# Patient Record
Sex: FEMALE | Race: WHITE | NOT HISPANIC OR LATINO | Employment: PART TIME | URBAN - METROPOLITAN AREA
[De-identification: names, ages, dates, MRNs, and addresses within clinical notes are randomized per-mention and may not be internally consistent; named-entity substitution may affect disease eponyms.]

---

## 2021-04-08 DIAGNOSIS — Z23 ENCOUNTER FOR IMMUNIZATION: ICD-10-CM

## 2022-05-09 ENCOUNTER — OFFICE VISIT (OUTPATIENT)
Dept: PODIATRY | Facility: CLINIC | Age: 67
End: 2022-05-09
Payer: COMMERCIAL

## 2022-05-09 VITALS — HEIGHT: 65 IN | WEIGHT: 162 LBS | RESPIRATION RATE: 16 BRPM | BODY MASS INDEX: 26.99 KG/M2

## 2022-05-09 DIAGNOSIS — M20.12 VALGUS DEFORMITY OF BOTH GREAT TOES: ICD-10-CM

## 2022-05-09 DIAGNOSIS — M21.42 ACQUIRED FLAT FOOT, LEFT: ICD-10-CM

## 2022-05-09 DIAGNOSIS — M20.11 VALGUS DEFORMITY OF BOTH GREAT TOES: ICD-10-CM

## 2022-05-09 DIAGNOSIS — B35.1 ONYCHOMYCOSIS: ICD-10-CM

## 2022-05-09 DIAGNOSIS — M21.41 ACQUIRED FLAT FOOT, RIGHT: ICD-10-CM

## 2022-05-09 DIAGNOSIS — M21.961 ACQUIRED DEFORMITY OF RIGHT FOOT: Primary | ICD-10-CM

## 2022-05-09 DIAGNOSIS — M21.962 ACQUIRED DEFORMITY OF LEFT FOOT: ICD-10-CM

## 2022-05-09 PROCEDURE — 99203 OFFICE O/P NEW LOW 30 MIN: CPT | Performed by: PODIATRIST

## 2022-05-09 PROCEDURE — L3000 FT INSERT UCB BERKELEY SHELL: HCPCS | Performed by: PODIATRIST

## 2022-05-09 RX ORDER — FAMOTIDINE 10 MG
10 TABLET ORAL 2 TIMES DAILY
COMMUNITY

## 2022-05-09 RX ORDER — ASPIRIN 81 MG/1
81 TABLET ORAL DAILY
COMMUNITY

## 2022-05-09 RX ORDER — ROSUVASTATIN CALCIUM 10 MG/1
10 TABLET, COATED ORAL DAILY
COMMUNITY

## 2022-05-09 RX ORDER — ESOMEPRAZOLE MAGNESIUM 40 MG/1
40 CAPSULE, DELAYED RELEASE ORAL
COMMUNITY

## 2022-05-09 RX ORDER — MONTELUKAST SODIUM 10 MG/1
10 TABLET ORAL
COMMUNITY

## 2022-05-09 RX ORDER — MULTIVIT-MIN/IRON FUM/FOLIC AC 7.5 MG-4
1 TABLET ORAL DAILY
COMMUNITY

## 2022-05-09 NOTE — PROGRESS NOTES
Assessment/Plan:  Acquired deformity foot bilateral   Hallux valgus deformity  Pronation syndrome  Acquired pes planus  Superficial mycosis  Plan  Foot exam   Patient advised on condition  She would benefit from pronation control  Her feet have been casted for custom molded foot orthotics  She will use these daily  In addition, patient would benefit from topical antifungal   She has been given direction  Diagnoses and all orders for this visit:    Acquired deformity of right foot    Acquired deformity of left foot    Acquired flat foot, right    Acquired flat foot, left    Onychomycosis    Valgus deformity of both great toes    Other orders  -     Multiple Vitamins-Minerals (multivitamin with minerals) tablet; Take 1 tablet by mouth daily  -     calcium-vitamin D (OSCAL) 250-125 MG-UNIT per tablet; Take 1 tablet by mouth daily  -     esomeprazole (NexIUM) 40 MG capsule; Take 40 mg by mouth every morning before breakfast  -     rosuvastatin (CRESTOR) 10 MG tablet; Take 10 mg by mouth daily  -     montelukast (SINGULAIR) 10 mg tablet; Take 10 mg by mouth daily at bedtime  -     famotidine (PEPCID) 10 mg tablet; Take 10 mg by mouth 2 (two) times a day  -     aspirin (ECOTRIN LOW STRENGTH) 81 mg EC tablet; Take 81 mg by mouth daily          Subjective:  Patient has several issues  She is concerned about widening of her feet  She know she has bunions  She gets occasional pain  In addition she is concerned about [place] toenails      Not on File      Current Outpatient Medications:     aspirin (ECOTRIN LOW STRENGTH) 81 mg EC tablet, Take 81 mg by mouth daily, Disp: , Rfl:     calcium-vitamin D (OSCAL) 250-125 MG-UNIT per tablet, Take 1 tablet by mouth daily, Disp: , Rfl:     esomeprazole (NexIUM) 40 MG capsule, Take 40 mg by mouth every morning before breakfast, Disp: , Rfl:     famotidine (PEPCID) 10 mg tablet, Take 10 mg by mouth 2 (two) times a day, Disp: , Rfl:     montelukast (SINGULAIR) 10 mg tablet, Take 10 mg by mouth daily at bedtime, Disp: , Rfl:     Multiple Vitamins-Minerals (multivitamin with minerals) tablet, Take 1 tablet by mouth daily, Disp: , Rfl:     rosuvastatin (CRESTOR) 10 MG tablet, Take 10 mg by mouth daily, Disp: , Rfl:     There is no problem list on file for this patient  Patient ID: Nury Zaragoza is a 74 y o  female  HPI    The following portions of the patient's history were reviewed and updated as appropriate:     family history is not on file  has no history on file for tobacco use, alcohol use, and drug use  Vitals:    05/09/22 0905   Resp: 16       Review of Systems      Objective:  Patient's shoes and socks removed  Foot Exam    General  General Appearance: appears stated age and healthy   Orientation: alert and oriented to person, place, and time   Affect: appropriate       Right Foot/Ankle     Inspection and Palpation  Swelling: none   Arch: pes planus  Hammertoes: fifth toe  Hallux valgus: yes  Skin Exam: dry skin;     Neurovascular  Dorsalis pedis: 3+  Posterior tibial: 3+      Left Foot/Ankle      Inspection and Palpation  Swelling: none   Arch: pes planus  Hammertoes: fifth toe  Hallux valgus: yes  Skin Exam: dry skin;     Neurovascular  Dorsalis pedis: 3+  Posterior tibial: 3+        Physical Exam  Vitals and nursing note reviewed  Constitutional:       Appearance: Normal appearance  Cardiovascular:      Rate and Rhythm: Normal rate and regular rhythm  Pulses:           Dorsalis pedis pulses are 3+ on the right side and 3+ on the left side  Posterior tibial pulses are 3+ on the right side and 3+ on the left side  Musculoskeletal:      Right foot: Bunion present  Left foot: Bunion present  Comments: Patient is pronated in stance and gait  She has mild metatarsus adductus  Tibial varum noted bilateral   Hallux valgus deformity noted  Feet:      Right foot:      Skin integrity: Dry skin present        Left foot: Skin integrity: Dry skin present  Skin:     Capillary Refill: Capillary refill takes less than 2 seconds  Comments: Nails are dystrophic  They demonstrate superficial white mycosis  Neurological:      Mental Status: She is alert  Psychiatric:         Mood and Affect: Mood normal          Behavior: Behavior normal          Thought Content:  Thought content normal          Judgment: Judgment normal

## 2023-09-19 ENCOUNTER — APPOINTMENT (OUTPATIENT)
Dept: RADIOLOGY | Facility: CLINIC | Age: 68
End: 2023-09-19
Payer: COMMERCIAL

## 2023-09-19 ENCOUNTER — OFFICE VISIT (OUTPATIENT)
Dept: URGENT CARE | Facility: CLINIC | Age: 68
End: 2023-09-19
Payer: MEDICARE

## 2023-09-19 VITALS
HEART RATE: 97 BPM | HEIGHT: 66 IN | BODY MASS INDEX: 29.73 KG/M2 | RESPIRATION RATE: 18 BRPM | TEMPERATURE: 98 F | OXYGEN SATURATION: 98 % | WEIGHT: 185 LBS

## 2023-09-19 DIAGNOSIS — S29.9XXA RIB INJURY: Primary | ICD-10-CM

## 2023-09-19 DIAGNOSIS — S29.9XXA RIB INJURY: ICD-10-CM

## 2023-09-19 PROCEDURE — 99213 OFFICE O/P EST LOW 20 MIN: CPT | Performed by: PHYSICIAN ASSISTANT

## 2023-09-19 PROCEDURE — 71101 X-RAY EXAM UNILAT RIBS/CHEST: CPT

## 2023-09-19 RX ORDER — LIDOCAINE 50 MG/G
1 PATCH TOPICAL DAILY
Qty: 30 PATCH | Refills: 0 | Status: SHIPPED | OUTPATIENT
Start: 2023-09-19

## 2023-09-19 RX ORDER — TIZANIDINE 2 MG/1
2 TABLET ORAL
Qty: 10 TABLET | Refills: 0 | Status: SHIPPED | OUTPATIENT
Start: 2023-09-19

## 2023-09-19 NOTE — PATIENT INSTRUCTIONS
Xray appears negative for any fracture. Will follow up with radiologist report when available. Recommend icing the area every 2 hours for 20-30 minutes, take Ibuprofen every 6-8 hours to reduce inflammation. If not improving over the next week, follow up with PCP or orthopedics. You may try lidocaine patches OTC. Make sure you are doing deep breathing. Rib Contusion   WHAT YOU NEED TO KNOW:   A rib contusion is a bruise on one or more of your ribs. DISCHARGE INSTRUCTIONS:   Return to the emergency department if:   You have increased chest pain. You have shortness of breath. You start to cough up blood. Your pain does not improve with pain medicine. Contact your healthcare provider if:   You have a cough. You have a fever. You have questions or concerns about your condition or care. Medicines: You may need any of the following:  NSAIDs , such as ibuprofen, help decrease swelling, pain, and fever. This medicine is available with or without a doctor's order. NSAIDs can cause stomach bleeding or kidney problems in certain people. If you take blood thinner medicine, always ask if NSAIDs are safe for you. Always read the medicine label and follow directions. Do not give these medicines to children younger than 6 months without direction from a healthcare provider. Prescription pain medicine  may be given. Ask how to take this medicine safely. Take your medicine as directed. Contact your healthcare provider if you think your medicine is not helping or if you have side effects. Tell your provider if you are allergic to any medicine. Keep a list of the medicines, vitamins, and herbs you take. Include the amounts, and when and why you take them. Bring the list or the pill bottles to follow-up visits. Carry your medicine list with you in case of an emergency. Deep breathing: To help prevent pneumonia, take 10 deep breaths every hour, even when you wake up during the night. Brace your ribs with your hands or a pillow while you take deep breaths or cough. This will help decrease your pain. You may need to use an incentive spirometer to help you take deeper breaths. Put the plastic piece into your mouth and take a very deep breath. Hold your breath as long as you can. Then let out your breath. Do this 10 times in a row every hour while you are awake. Rest:  Rest your ribs to decrease swelling and allow the injury to heal faster. Avoid activities that may cause more pain or damage to your ribs. As your pain decreases, begin movements slowly. Ice:  Ice helps decrease swelling and pain. Ice may also help prevent tissue damage. Use an ice pack or put crushed ice in a plastic bag. Cover it with a towel and place it on your bruised area for 15 to 20 minutes every hour as directed. Follow up with your doctor as directed:  Write down your questions so you remember to ask them during your visits. © Copyright Elisha Goltz 2022 Information is for End User's use only and may not be sold, redistributed or otherwise used for commercial purposes. The above information is an  only. It is not intended as medical advice for individual conditions or treatments. Talk to your doctor, nurse or pharmacist before following any medical regimen to see if it is safe and effective for you. How to Use an Incentive Spirometer   WHAT YOU NEED TO KNOW:   An incentive spirometer is a device that measures how deeply you can inhale (breathe in). It helps you take slow, deep breaths to expand and fill your lungs with air. This helps prevent lung problems, such as pneumonia. The incentive spirometer is made up of a breathing tube, an air chamber, and an indicator. The breathing tube is connected to the air chamber and has a mouthpiece at the end. The indicator is found inside the device. DISCHARGE INSTRUCTIONS:   Reasons to use an incentive spirometer:   An incentive spirometer is most commonly used after surgery. People who are at increased risk of airway or breathing problems may also use one. These include people who smoke or have lung disease. This may also include people who are not active or cannot move well. How to use an incentive spirometer:  Sit up as straight as possible. Do not bend your head forward or backward. Hold the incentive spirometer in an upright position. Place the target pointer to the level that you need to reach or that your healthcare provider has suggested. Exhale (breathe out) normally and then do the following:  Put the mouthpiece in your mouth and close your lips tightly around it. Do not block the mouthpiece with your tongue. Inhale slowly and deeply through the mouthpiece to raise the indicator. Try to make the indicator rise up to the level of the goal marker. When you cannot inhale any longer, remove the mouthpiece and hold your breath for at least 3 seconds. Exhale normally. Repeat these steps 10 to 12 times every hour when you are awake, or as often as directed. Clean the mouthpiece with soap and water after each use. Do not use a disposable mouthpiece for longer than 24 hours. Keep a log of the highest level you are able to reach each time. This will help healthcare providers see if your lung function improves. Follow up with your doctor as directed:  Write down your questions so you remember to ask them during your visits. Contact your healthcare provider if:   You feel dizzy or lightheaded. You have a wound that is painful every time you breathe deeply. You have questions or concerns about how to use your IS. Return to the emergency department if:   You have chest pain or shortness of breath. You feel faint. © Copyright Carissa Mar 2022 Information is for End User's use only and may not be sold, redistributed or otherwise used for commercial purposes. The above information is an  only.  It is not intended as medical advice for individual conditions or treatments. Talk to your doctor, nurse or pharmacist before following any medical regimen to see if it is safe and effective for you.

## 2023-09-19 NOTE — PROGRESS NOTES
[facility]        NAME: Lila Pineda is a 69 y.o. female  : 1955    MRN: [id number]  DATE: 2023  TIME: 12:05 PM    Assessment and Plan   Rib injury [S29. 9XXA]  1. Rib injury  XR ribs right w pa chest min 3 views    lidocaine (Lidoderm) 5 %    tiZANidine (ZANAFLEX) 2 mg tablet            Patient Instructions   Patient Instructions     Xray appears negative for any fracture. Will follow up with radiologist report when available. Recommend icing the area every 2 hours for 20-30 minutes, take Ibuprofen every 6-8 hours to reduce inflammation. If not improving over the next week, follow up with PCP or orthopedics. You may try lidocaine patches OTC. Make sure you are doing deep breathing. Rib Contusion   WHAT YOU NEED TO KNOW:   A rib contusion is a bruise on one or more of your ribs. DISCHARGE INSTRUCTIONS:   Return to the emergency department if:   • You have increased chest pain. • You have shortness of breath. • You start to cough up blood. • Your pain does not improve with pain medicine. Contact your healthcare provider if:   • You have a cough. • You have a fever. • You have questions or concerns about your condition or care. Medicines: You may need any of the following:  • NSAIDs , such as ibuprofen, help decrease swelling, pain, and fever. This medicine is available with or without a doctor's order. NSAIDs can cause stomach bleeding or kidney problems in certain people. If you take blood thinner medicine, always ask if NSAIDs are safe for you. Always read the medicine label and follow directions. Do not give these medicines to children younger than 6 months without direction from a healthcare provider. • Prescription pain medicine  may be given. Ask how to take this medicine safely. • Take your medicine as directed. Contact your healthcare provider if you think your medicine is not helping or if you have side effects.  Tell your provider if you are allergic to any medicine. Keep a list of the medicines, vitamins, and herbs you take. Include the amounts, and when and why you take them. Bring the list or the pill bottles to follow-up visits. Carry your medicine list with you in case of an emergency. Deep breathing:   • To help prevent pneumonia, take 10 deep breaths every hour, even when you wake up during the night. Brace your ribs with your hands or a pillow while you take deep breaths or cough. This will help decrease your pain. • You may need to use an incentive spirometer to help you take deeper breaths. Put the plastic piece into your mouth and take a very deep breath. Hold your breath as long as you can. Then let out your breath. Do this 10 times in a row every hour while you are awake. Rest:  Rest your ribs to decrease swelling and allow the injury to heal faster. Avoid activities that may cause more pain or damage to your ribs. As your pain decreases, begin movements slowly. Ice:  Ice helps decrease swelling and pain. Ice may also help prevent tissue damage. Use an ice pack or put crushed ice in a plastic bag. Cover it with a towel and place it on your bruised area for 15 to 20 minutes every hour as directed. Follow up with your doctor as directed:  Write down your questions so you remember to ask them during your visits. © Copyright [facility] 2022 Information is for End User's use only and may not be sold, redistributed or otherwise used for commercial purposes. The above information is an  only. It is not intended as medical advice for individual conditions or treatments. Talk to your doctor, nurse or pharmacist before following any medical regimen to see if it is safe and effective for you. How to Use an Incentive Spirometer   WHAT YOU NEED TO KNOW:   An incentive spirometer is a device that measures how deeply you can inhale (breathe in).  It helps you take slow, deep breaths to expand and fill your lungs with air. This helps prevent lung problems, such as pneumonia. The incentive spirometer is made up of a breathing tube, an air chamber, and an indicator. The breathing tube is connected to the air chamber and has a mouthpiece at the end. The indicator is found inside the device. DISCHARGE INSTRUCTIONS:   Reasons to use an incentive spirometer: An incentive spirometer is most commonly used after surgery. People who are at increased risk of airway or breathing problems may also use one. These include people who smoke or have lung disease. This may also include people who are not active or cannot move well. How to use an incentive spirometer:  Sit up as straight as possible. Do not bend your head forward or backward. Hold the incentive spirometer in an upright position. Place the target pointer to the level that you need to reach or that your healthcare provider has suggested. Exhale (breathe out) normally and then do the following:  • Put the mouthpiece in your mouth and close your lips tightly around it. Do not block the mouthpiece with your tongue. • Inhale slowly and deeply through the mouthpiece to raise the indicator. Try to make the indicator rise up to the level of the goal marker. • When you cannot inhale any longer, remove the mouthpiece and hold your breath for at least 3 seconds. • Exhale normally. • Repeat these steps 10 to 12 times every hour when you are awake, or as often as directed. • Clean the mouthpiece with soap and water after each use. Do not use a disposable mouthpiece for longer than 24 hours. • Keep a log of the highest level you are able to reach each time. This will help healthcare providers see if your lung function improves. Follow up with your doctor as directed:  Write down your questions so you remember to ask them during your visits. Contact your healthcare provider if:   • You feel dizzy or lightheaded.     • You have a wound that is painful every time you breathe deeply. • You have questions or concerns about how to use your IS. Return to the emergency department if:   • You have chest pain or shortness of breath. • You feel faint. © Copyright Morenita Ashford 2022 Information is for End User's use only and may not be sold, redistributed or otherwise used for commercial purposes. The above information is an  only. It is not intended as medical advice for individual conditions or treatments. Talk to your doctor, nurse or pharmacist before following any medical regimen to see if it is safe and effective for you. Follow up with PCP in 3-5 days. Proceed to  ER if symptoms worsen. Chief Complaint     Chief Complaint   Patient presents with   • Rib Injury     Right side rib pain was pushing and pulling crates and felt and heard a pop         History of Present Illness       The patient is a 12-year-old female presenting today for right-sided rib pain x 10 days. She was pushing and pulling crates when she felt a pop. Since then she has been having pain. Tried advil/tylenol which helps. Reports the pain at night when rolling over in her sleep. Review of Systems   Review of Systems   Musculoskeletal: Positive for myalgias (right rib pain). Skin: Negative for color change and wound.          Current Medications       Current Outpatient Medications:   •  aspirin (ECOTRIN LOW STRENGTH) 81 mg EC tablet, Take 81 mg by mouth daily, Disp: , Rfl:   •  calcium-vitamin D (OSCAL) 250-125 MG-UNIT per tablet, Take 1 tablet by mouth daily, Disp: , Rfl:   •  esomeprazole (NexIUM) 40 MG capsule, Take 40 mg by mouth every morning before breakfast, Disp: , Rfl:   •  famotidine (PEPCID) 10 mg tablet, Take 10 mg by mouth 2 (two) times a day, Disp: , Rfl:   •  lidocaine (Lidoderm) 5 %, Apply 1 patch topically over 12 hours daily Remove & Discard patch within 12 hours or as directed by MD, Disp: 30 patch, Rfl: 0  •  montelukast (SINGULAIR) 10 mg tablet, Take 10 mg by mouth daily at bedtime, Disp: , Rfl:   •  Multiple Vitamins-Minerals (multivitamin with minerals) tablet, Take 1 tablet by mouth daily, Disp: , Rfl:   •  rosuvastatin (CRESTOR) 10 MG tablet, Take 10 mg by mouth daily, Disp: , Rfl:   •  tiZANidine (ZANAFLEX) 2 mg tablet, Take 1 tablet (2 mg total) by mouth daily at bedtime, Disp: 10 tablet, Rfl: 0    Current Allergies     Allergies as of 09/19/2023 - Reviewed 09/19/2023   Allergen Reaction Noted   • Biaxin [clarithromycin] Rash 09/19/2023            The following portions of the patient's history were reviewed and updated as appropriate: allergies, current medications, past family history, past medical history, past social history, past surgical history and problem list.     History reviewed. No pertinent past medical history. History reviewed. No pertinent surgical history. No family history on file. Medications have been verified. Objective   Pulse 97   Temp 98 °F (36.7 °C)   Resp 18   Ht 5' 5.5" (1.664 m)   Wt 83.9 kg (185 lb)   SpO2 98%   BMI 30.32 kg/m²        Physical Exam     Physical Exam  Vitals and nursing note reviewed. Constitutional:       General: She is not in acute distress. Appearance: Normal appearance. She is normal weight. She is not ill-appearing, toxic-appearing or diaphoretic. HENT:      Head: Normocephalic and atraumatic. Cardiovascular:      Rate and Rhythm: Normal rate and regular rhythm. Heart sounds: Normal heart sounds. No murmur heard. No friction rub. No gallop. Pulmonary:      Effort: Pulmonary effort is normal. No respiratory distress. Breath sounds: Normal breath sounds. No stridor. No wheezing, rhonchi or rales. Chest:      Chest wall: No tenderness. Musculoskeletal:         General: Tenderness present. No swelling. Normal range of motion. Comments: Right ribs 7-8 TTP on mid axillary line      Skin:     General: Skin is warm and dry.       Capillary Refill: Capillary refill takes less than 2 seconds. Findings: No bruising or erythema. Neurological:      Mental Status: She is alert.

## 2024-01-17 ENCOUNTER — APPOINTMENT (EMERGENCY)
Dept: RADIOLOGY | Facility: HOSPITAL | Age: 69
End: 2024-01-17
Payer: COMMERCIAL

## 2024-01-17 ENCOUNTER — HOSPITAL ENCOUNTER (EMERGENCY)
Facility: HOSPITAL | Age: 69
Discharge: HOME | End: 2024-01-17
Attending: EMERGENCY MEDICINE
Payer: COMMERCIAL

## 2024-01-17 VITALS
SYSTOLIC BLOOD PRESSURE: 143 MMHG | WEIGHT: 179.68 LBS | DIASTOLIC BLOOD PRESSURE: 70 MMHG | TEMPERATURE: 98.4 F | OXYGEN SATURATION: 97 % | HEART RATE: 68 BPM | BODY MASS INDEX: 29.44 KG/M2 | RESPIRATION RATE: 16 BRPM

## 2024-01-17 DIAGNOSIS — S13.4XXA SPRAIN OF LIGAMENTS OF CERVICAL SPINE, INITIAL ENCOUNTER: ICD-10-CM
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
## 2024-01-17 DIAGNOSIS — S29.011A CHEST WALL MUSCLE STRAIN, INITIAL ENCOUNTER: Primary | ICD-10-CM

## 2024-01-17 PROCEDURE — 71101 X-RAY EXAM UNILAT RIBS/CHEST: CPT

## 2024-01-17 PROCEDURE — 99284 EMERGENCY DEPT VISIT MOD MDM: CPT | Performed by: EMERGENCY MEDICINE

## 2024-01-17 PROCEDURE — 70450 CT HEAD/BRAIN W/O DYE: CPT

## 2024-01-17 PROCEDURE — 72125 CT NECK SPINE W/O DYE: CPT

## 2024-01-17 PROCEDURE — 99284 EMERGENCY DEPT VISIT MOD MDM: CPT

## 2024-01-17 PROCEDURE — G1004 CDSM NDSC: HCPCS

## 2024-01-17 RX ORDER — ALBUTEROL SULFATE 90 UG/1
2 AEROSOL, METERED RESPIRATORY (INHALATION) EVERY 6 HOURS PRN
COMMUNITY

## 2024-01-17 RX ORDER — BIOTIN 1 MG
5000 TABLET ORAL DAILY
COMMUNITY
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
## 2024-01-17 NOTE — DISCHARGE INSTRUCTIONS
Return to the ER for further concerns or worsening symptoms  Follow up with your primary care physician in 1-2 days  Continue tylenol and ibuprofen as needed for pain

## 2024-04-09 ENCOUNTER — TELEPHONE (OUTPATIENT)
Age: 69
End: 2024-04-09

## 2024-04-09 NOTE — TELEPHONE ENCOUNTER
H&P essentially unchanged. r/s from 5/16 with Dr. Shook as provider now is half days, left v/m with new appt info and asked for a c/b to confirm or r/s if needed

## 2024-04-09 NOTE — TELEPHONE ENCOUNTER
Rec'd call from patient. Newly rescheduled appt date/time does not work for her.    R/S appt with Dr. Shook.

## 2024-05-13 ENCOUNTER — OFFICE VISIT (OUTPATIENT)
Age: 69
End: 2024-05-13
Payer: MEDICARE

## 2024-05-13 VITALS — HEIGHT: 66 IN | TEMPERATURE: 98.7 F | WEIGHT: 175 LBS | BODY MASS INDEX: 28.12 KG/M2

## 2024-05-13 DIAGNOSIS — L91.8 SKIN TAG: ICD-10-CM

## 2024-05-13 DIAGNOSIS — L81.4 LENTIGO: ICD-10-CM

## 2024-05-13 DIAGNOSIS — D22.70 MULTIPLE BENIGN MELANOCYTIC NEVI OF UPPER EXTREMITY, LOWER EXTREMITY, AND TRUNK: Primary | ICD-10-CM

## 2024-05-13 DIAGNOSIS — D22.60 MULTIPLE BENIGN MELANOCYTIC NEVI OF UPPER EXTREMITY, LOWER EXTREMITY, AND TRUNK: Primary | ICD-10-CM

## 2024-05-13 DIAGNOSIS — L72.0 EPIDERMAL CYST: ICD-10-CM

## 2024-05-13 DIAGNOSIS — L71.9 ROSACEA: ICD-10-CM

## 2024-05-13 DIAGNOSIS — D18.01 CHERRY ANGIOMA: ICD-10-CM

## 2024-05-13 DIAGNOSIS — D22.5 MULTIPLE BENIGN MELANOCYTIC NEVI OF UPPER EXTREMITY, LOWER EXTREMITY, AND TRUNK: Primary | ICD-10-CM

## 2024-05-13 DIAGNOSIS — L82.1 SEBORRHEIC KERATOSIS: ICD-10-CM

## 2024-05-13 PROCEDURE — 99204 OFFICE O/P NEW MOD 45 MIN: CPT | Performed by: DERMATOLOGY

## 2024-05-13 NOTE — PATIENT INSTRUCTIONS
"MELANOCYTIC NEVI (\"Moles\")    Assessment and Plan:  Based on a thorough discussion of this condition and the management approach to it (including a comprehensive discussion of the known risks, side effects and potential benefits of treatment), the patient (family) agrees to implement the following specific plan:  When outside we recommend using a wide brim hat, sunglasses, long sleeve and pants, sunscreen with SPF 30+ with reapplication every 2 hours, or SPF specific clothing   Benign, reassured  Annual skin check     Melanocytic Nevi  Melanocytic nevi (\"moles\") are tan or brown, raised or flat areas of the skin which have an increased number of melanocytes. Melanocytes are the cells in our body which make pigment and account for skin color.    Some moles are present at birth (I.e., \"congenital nevi\"), while others come up later in life (i.e., \"acquired nevi\").  The sun can stimulate the body to make more moles.  Sunburns are not the only thing that triggers more moles.  Chronic sun exposure can do it too.     Clinically distinguishing a healthy mole from melanoma may be difficult, even for experienced dermatologists. The \"ABCDE's\" of moles have been suggested as a means of helping to alert a person to a suspicious mole and the possible increased risk of melanoma.  The suggestions for raising alert are as follows:    Asymmetry: Healthy moles tend to be symmetric, while melanomas are often asymmetric.  Asymmetry means if you draw a line through the mole, the two halves do not match in color, size, shape, or surface texture. Asymmetry can be a result of rapid enlargement of a mole, the development of a raised area on a previously flat lesion, scaling, ulceration, bleeding or scabbing within the mole.  Any mole that starts to demonstrate \"asymmetry\" should be examined promptly by a board certified dermatologist.     Border: Healthy moles tend to have discrete, even borders.  The border of a melanoma often blends into " "the normal skin and does not sharply delineate the mole from normal skin.  Any mole that starts to demonstrate \"uneven borders\" should be examined promptly by a board certified dermatologist.     Color: Healthy moles tend to be one color throughout.  Melanomas tend to be made up of different colors ranging from dark black, blue, white, or red.  Any mole that demonstrates a color change should be examined promptly by a board certified dermatologist.     Diameter: Healthy moles tend to be smaller than 0.6 cm in size; an exception are \"congenital nevi\" that can be larger.  Melanomas tend to grow and can often be greater than 0.6 cm (1/4 of an inch, or the size of a pencil eraser). This is only a guideline, and many normal moles may be larger than 0.6 cm without being unhealthy.  Any mole that starts to change in size (small to bigger or bigger to smaller) should be examined promptly by a board certified dermatologist.     Evolving: Healthy moles tend to \"stay the same.\"  Melanomas may often show signs of change or evolution such as a change in size, shape, color, or elevation.  Any mole that starts to itch, bleed, crust, burn, hurt, or ulcerate or demonstrate a change or evolution should be examined promptly by a board certified dermatologist.      EPIDERMAL INCLUSION CYST    Assessment and Plan:  Based on a thorough discussion of this condition and the management approach to it (including a comprehensive discussion of the known risks, side effects and potential benefits of treatment), the patient (family) agrees to implement the following specific plan:  Recommend evaluation by Opthalmologist.    What are epidermal inclusion cysts?  Epidermal inclusion cysts are the most common, benign cutaneous cysts. There are many different names for epidermal inclusion cysts, including epidermoid cyst, epidermal cyst, infundibular cyst, inclusion cyst, and keratin cyst. These cysts can occur anywhere on the body and typically " present as nodules directly underneath the skin. There is often a visible pore or opening in the center. The cysts are freely moveable and can range from a few millimeters to several centimeters in diameter. The center of epidermoid cysts almost always contains keratin, which has a cheesy appearance, and not sebum. They also do not originate from sebaceous glands. Therefore, epidermal inclusion cysts are not the same as sebaceous cysts.    Cysts may remain stable or progressively enlarge over time. There are no reliable predictive factors to tell if an epidermal inclusion cyst will enlarge, become inflamed, or remain quiescent. Infected cysts tend to become larger, turn red, and are more noticeable to the patient. There may be accompanying pain and discomfort.     What causes epidermal inclusion cysts?  Epidermal inclusion cysts often appear out of the blue and are not contagious. They are due to a proliferation of epidermal cells within the dermis and are more common in men than women. They occur more frequently in patients in their 20s to 40s. Epidermal inclusion cysts by themselves are usually not inherited, but they can be hereditary in rare syndromes such as Monroy syndrome, nodular elastosis with cysts and comedones (Favre-Racouchot syndrome), and basal cell nevus syndrome (Gorlin syndrome). Elderly patients with chronic sun-damaged skin areas have a higher likelihood of developing epidermoid cysts. They often occur in areas where hair follicles have been inflamed or repeatedly irritated are more frequent in patients with acne vulgaris. In the  period, they are called milia.     Patients on BRAF inhibitors such as imiquimod and cyclosporine have a higher incidence of epidermoid cysts of the face.    How do we diagnose an epidermal inclusion cyst?  Epidermoid inclusion cysts are often diagnosed by history and physical exam. There is usually no need for biopsy prior to removal.  Radiographic and  "laboratory exams, such as ultrasound studies, are unnecessary and not typically ordered unless the practitioner suspects a genetic condition.    What is the treatment for an epidermal inclusion cyst?  Inflamed, uninfected epidermal inclusion cysts rarely resolve spontaneously without therapy or surgical intervention. Treatment is not emergent unless desired by the patient.     Definitive treatment is via surgical excision with walls intact. This method will prevent recurrence. This is best done when the cyst is not inflamed, to decrease the probability of rupture during surgery.   A local anesthetic will be injected around the cyst  A small incision is made in the skin overlying the cyst, and contents are expressed  The incision is repaired with sutures    Another option is to use a 4mm punch biopsy with cyst extraction through the defect.    Incision and drainage is often needed if the cyst is infected or inflamed. If there is surrounding cellulitis, oral antibiotic therapy may be necessary. The common agents used target methicillin sensitive Staphylococcal aureus and methicillin resistant S aureus in areas of high prevalence.     ACROCHORDON (\"SKIN TAG\")    Assessment and Plan:  Based on a thorough discussion of this condition and the management approach to it (including a comprehensive discussion of the known risks, side effects and potential benefits of treatment), the patient (family) agrees to implement the following specific plan:  Reassured benign.  Insurance does not cover for removal. If desire can be remove for a cosmetic fee of $20.00 each per lesion.    Skin tags are common, soft, harmless skin lesions that are also called, in the appropriate settings, papillomas, fibroepithelial polyps, and soft fibromas.  They are made up of loosely arranged collagen fibers and blood vessels surrounded by a thickened or thinned-out epidermis.    Skin tags tend to develop in both men and women as we grow older.  They " are usually found on the skin folds (neck, armpits, groin).  It is not known what specifically causes skin tags.  Certain factors, though, do appear to play a role:  Chaffing and irritation from skin rubbing together  High levels of growth factors (as seen, for example, in pregnancy or in acromegaly/gigantism)  Insulin resistance  Human papillomavirus (wart virus)    We discussed that most skin tags do not need to be treated unless they are specifically causing the patient physical distress or limitation or pose a risk for a larger problem such as an infection that forms secondary to excoriation or chronic irritation.    We had a thorough discussion of treatment options and specific risks (including that any procedural treatment may not be covered by insurance and would then be the patient's responsibility) and benefits/alternatives including but not limited to the following:  Cryotherapy (freezing)  Shave removal  Surgical excision (snip excision with scissors)  Electrosurgery  Ligation (we do not do this procedure and counseled against it due to risk of tissue necrosis and infection)    ROSACEA    Assessment and Plan:  Based on a thorough discussion of this condition and the management approach to it (including a comprehensive discussion of the known risks, side effects and potential benefits of treatment), the patient (family) agrees to implement the following specific plan:  Metronidazole 0.75% cream apply topically to the nasal tip 2 times daily    Rosacea is a chronic rash affecting the mid-face including the nose, cheeks, chin, forehead, and eyelids. The incidence is usually greatest between the ages of 30-60 years and is more common in people with fair skin. Common characteristics include redness, telangiectasias, papules and pustules over affected areas. Rosacea may look similar to acne, but there is a lack of comedones. Occasionally the eyes may also be involved in ocular rosacea. In advanced disease,  enlargement of the sebaceous glands in the nose, termed rhinophyma, may be present.     Rosacea results in red spots (papules) and sometimes pustules over the face, but unlike acne there are no blackheads, whiteheads, or cystic nodules. Patients often experience increased facial flushing with prominent blood vessels (erythematotelangiectatic rosacea) and dry, sensitive skin. These symptoms are exacerbated by sun exposure, hot or spicy foods, topical steroids and oil-based facial products.     In ocular rosacea, eyelids may be red and sore due to conjunctivitis, keratitis, and episcleritis. If rhinophyma develops due to enlargement of sebaceous glands, the patient may have an enlarged and irregularly shaped nose with prominent pores. In rosacea that is refractory to treatment, patients can develop persistent redness and swelling of the face due to lymphatic obstruction (Morbihan disease).     Distribution around the cheeks may be confused with the malar or “butterfly rash” of lupus. However, the rash of lupus spares the nasal creases and lacks papules and pustules. If signs of photosensitivity, oral ulcers, arthritis, and kidney dysfunction are present then consider referral to a rheumatologist.     There are many potential causes of rosacea including genetic, environmental, vascular, and inflammatory factors. These include, but are not limited to:  Chronic exposure to ultraviolet radiation   Increased immune responses in the form of cathelicidins that promote vessel dilation and infiltration with white blood cells (neutrophils) into the dermis  Increased matrix metalloproteinases such as collagen and elastase that remodel normal tissue may contribute to inflammation of the skin making it thicker and harder  There is some evidence to suggest that increased numbers of demodex mites on patient skin may contribute to rosacea papules     General Treatment Approach   Avoid exacerbating factors such as heat, spicy foods,  and alcohol   Use daily SPF30+ sunscreen and other methods of coverage for sun protection  Use water-based make-up   Avoid applying topical steroids to affected areas as they can cause perioral dermatitis and exacerbate rosacea     Topical Treatment Approach  Metronidazole cream or gel by itself or in combination with oral antibiotics for more severe cases  Azelaic acid cream or lotion is effective for mild inflammatory rosacea when applied twice daily to affected areas  Brimonidine gel and oxymetazoline hydrochloride cream can reduce facial redness temporarily   Ivermectin cream can treat papulopustular rosacea by controlling demodex mites and inflammation   Pimecrolimus cream or tacrolimus ointment twice a day for 2-3 months can help reduce inflammation    Oral Treatment Approach  Antibiotics such as doxycycline, minocycline, or erythromycin for 1-3 months  Clonidine and carvedilol can help reduce facial flushing and are generally well tolerated. Common side effects include low blood pressure, gastrointestinal upset, dry eyes, blurred vision and low heart rate.   Isotretinoin at low doses can be effective for long term treatment when antibiotics fail. Side effects may make it unsuitable for some patients.   NSAIDs such as diclofenac can help reduce discomfort and redness in the skin.     Procedural/Surgical Treatment Approach   Vascular lasers or intense pulsed light treatment may be used to treat persistent telangiectasia and papulopustular rosacea  Plastic surgery and carbon dioxide lasers may be used to treat rhinophyma       LENTIGO    Assessment and Plan:  Based on a thorough discussion of this condition and the management approach to it (including a comprehensive discussion of the known risks, side effects and potential benefits of treatment), the patient (family) agrees to implement the following specific plan:  When outside we recommend using a wide brim hat, sunglasses, long sleeve and pants, sunscreen  with SPF 30+ with reapplication every 2 hours, or SPF specific clothing       What is a lentigo?  A lentigo is a pigmented flat or slightly raised lesion with a clearly defined edge. Unlike an ephelis (freckle), it does not fade in the winter months. There are several kinds of lentigo.  The name lentigo originally referred to its appearance resembling a small lentil. The plural of lentigo is lentigines, although “lentigos” is also in common use.    Who gets lentigines?  Lentigines can affect males and females of all ages and races. Solar lentigines are especially prevalent in fair skinned adults. Lentigines associated with syndromes are present at birth or arise during childhood.    What causes lentigines?  Common forms of lentigo are due to exposure to ultraviolet radiation:  Sun damage including sunburn   Indoor tanning   Phototherapy, especially photochemotherapy (PUVA)    Ionizing radiation, eg radiation therapy, can also cause lentigines.  Several familial syndromes associated with widespread lentigines originate from mutations in Art-MAP kinase, mTOR signaling and PTEN pathways.    What is the treatment for lentigines?  Most lentigines are left alone. Attempts to lighten them may not be successful. The following approaches are used:  SPF 50+ broad-spectrum sunscreen   Hydroquinone bleaching cream   Alpha hydroxy acids   Vitamin C   Retinoids   Azelaic acid   Chemical peels  Individual lesions can be permanently removed using:  Cryotherapy   Intense pulsed light   Pigment lasers    How can lentigines be prevented?  Lentigines associated with exposure ultraviolet radiation can be prevented by very careful sun protection. Clothing is more successful at preventing new lentigines than are sunscreens.    What is the outlook for lentigines?  Lentigines usually persist. They may increase in number with age and sun exposure. Some in sun-protected sites may fade and disappear.    TEMPLETON ANGIOMAS    Assessment and  "Plan:  Based on a thorough discussion of this condition and the management approach to it (including a comprehensive discussion of the known risks, side effects and potential benefits of treatment), the patient (family) agrees to implement the following specific plan:  Monitor for changes  Benign, reassured      Assessment and Plan:    Cherry angioma, also known as Leonardo de Rajeev spots, are benign vascular skin lesions. A \"cherry angioma\" is a firm red, blue or purple papule, 0.1-1 cm in diameter. When thrombosed, they can appear black in colour until evaluated with a dermatoscope when the red or purple colour is more easily seen. Cherry angioma may develop on any part of the body but most often appear on the scalp, face, lips and trunk.  An angioma is due to proliferating endothelial cells; these are the cells that line the inside of a blood vessel.    Angiomas can arise in early life or later in life; the most common type of angioma is a cherry angioma.  Cherry angiomas are very common in males and females of any age or race. They are more noticeable in white skin than in skin of colour. They markedly increase in number from about the age of 40. There may be a family history of similar lesions. Eruptive cherry angiomas have been rarely reported to be associated with internal malignancy. The cause of angiomas is unknown. Genetic analysis of cherry angiomas has shown that they frequently carry specific somatic missense mutations in the GNAQ and GNA11 (Q209H) genes, which are involved in other vascular and melanocytic proliferations.      SEBORRHEIC KERATOSIS; NON-INFLAMED    Assessment and Plan:  Based on a thorough discussion of this condition and the management approach to it (including a comprehensive discussion of the known risks, side effects and potential benefits of treatment), the patient (family) agrees to implement the following specific plan:  Monitor for changes  Benign, reassured      Seborrheic " "Keratosis  A seborrheic keratosis is a harmless warty spot that appears during adult life as a common sign of skin aging.  Seborrheic keratoses can arise on any area of skin, covered or uncovered, with the usual exception of the palms and soles. They do not arise from mucous membranes. Seborrheic keratoses can have highly variable appearance.      Seborrheic keratoses are extremely common. It has been estimated that over 90% of adults over the age of 60 years have one or more of them. They occur in males and females of all races, typically beginning to erupt in the 30s or 40s. They are uncommon under the age of 20 years.  The precise cause of seborrhoeic keratoses is not known.  Seborrhoeic keratoses are considered degenerative in nature. As time goes by, seborrheic keratoses tend to become more numerous. Some people inherit a tendency to develop a very large number of them; some people may have hundreds of them.      There is no easy way to remove multiple lesions on a single occasion.  Unless a specific lesion is \"inflamed\" and is causing pain or stinging/burning or is bleeding, most insurance companies do not authorize treatment.  "

## 2024-05-13 NOTE — PROGRESS NOTES
"[facility] Note     Patient Name: Cristal Dumont  Encounter Date: 5/13/24     Have you been cared for by a [facility] Dermatologist in the last 3 years and, if so, which description applies to you?    NO.   I am considered a \"new\" patient and must complete all patient intake questions. I am FEMALE/of child-bearing potential.    REVIEW OF SYSTEMS:  Have you recently had or currently have any of the following? Recent fever or chills? No  Any non-healing wound? No  Are you pregnant or planning to become pregnant? No  Are you currently or planning to be nursing or breast feeding? No   PAST MEDICAL HISTORY:  Have you personally ever had or currently have any of the following?  If \"YES,\" then please provide more detail. Skin cancer (such as Melanoma, Basal Cell Carcinoma, Squamous Cell Carcinoma?  No  Tuberculosis, HIV/AIDS, Hepatitis B or C: No  Radiation Treatment No   HISTORY OF IMMUNOSUPPRESSION:   Do you have a history of any of the following:  Systemic Immunosuppression such as Diabetes, Biologic or Immunotherapy, Chemotherapy, Organ Transplantation, Bone Marrow Transplantation?  No    Answering \"YES\" requires the addition of the dotphrase \"IMMUNOSUPPRESSED\" as the first diagnosis of the patient's visit.   FAMILY HISTORY:  Any \"first degree relatives\" (parent, brother, sister, or child) with the following?    Skin Cancer, Pancreatic or Other Cancer? YES, father colon cancer and prostate cancer, brothers prostate cancer   PATIENT EXPERIENCE:    Do you want the Dermatologist to perform a COMPLETE skin exam today including a clinical examination under the \"bra and underwear\" areas?  Yes  If necessary, do we have your permission to call and leave a detailed message on your Preferred Phone number that includes your specific medical information?  Yes      Allergies   Allergen Reactions   • Biaxin [Clarithromycin] Rash      Current Outpatient Medications:   •  albuterol (PROVENTIL HFA,VENTOLIN HFA) 90 " "mcg/act inhaler, Inhale 2 puffs every 6 (six) hours as needed for wheezing, Disp: , Rfl:   •  aspirin (ECOTRIN LOW STRENGTH) 81 mg EC tablet, Take 81 mg by mouth daily, Disp: , Rfl:   •  Biotin 1000 MCG tablet, Take 5,000 mcg by mouth daily, Disp: , Rfl:   •  Calcium Carbonate (CALCIUM 600 PO), Take by mouth 2 (two) times a day, Disp: , Rfl:   •  esomeprazole (NexIUM) 40 MG capsule, Take 40 mg by mouth every morning before breakfast, Disp: , Rfl:   •  famotidine (PEPCID) 10 mg tablet, Take 10 mg by mouth in the morning, Disp: , Rfl:   •  metroNIDAZOLE (METROCREAM) 0.75 % cream, Apply topically 2 (two) times a day To the nasal tip., Disp: 45 g, Rfl: 2  •  montelukast (SINGULAIR) 10 mg tablet, Take 10 mg by mouth daily at bedtime, Disp: , Rfl:   •  Multiple Vitamins-Minerals (multivitamin with minerals) tablet, Take 1 tablet by mouth daily, Disp: , Rfl:   •  rosuvastatin (CRESTOR) 10 MG tablet, Take 10 mg by mouth daily, Disp: , Rfl:   •  calcium-vitamin D (OSCAL) 250-125 MG-UNIT per tablet, Take 1 tablet by mouth daily (Patient not taking: Reported on 1/17/2024), Disp: , Rfl:   •  lidocaine (Lidoderm) 5 %, Apply 1 patch topically over 12 hours daily Remove & Discard patch within 12 hours or as directed by MD (Patient not taking: Reported on 1/17/2024), Disp: 30 patch, Rfl: 0  •  tiZANidine (ZANAFLEX) 2 mg tablet, Take 1 tablet (2 mg total) by mouth daily at bedtime (Patient not taking: Reported on 1/17/2024), Disp: 10 tablet, Rfl: 0          Whom besides the patient is providing clinical information about today's encounter?   NO ADDITIONAL HISTORIAN (patient alone provided history)    Physical Exam and Assessment/Plan by Diagnosis:    MELANOCYTIC NEVI (\"Moles\")    Physical Exam:  Anatomic Location Affected:   Mostly on sun-exposed areas of the trunk and extremities  Morphological Description:  Scattered, 1-4mm round to ovoid, symmetrical-appearing, even bordered, skin colored to dark brown macules/papules, mostly in " "sun-exposed areas  Pertinent Positives:  Pertinent Negatives:    Additional History of Present Condition:      Assessment and Plan:  Based on a thorough discussion of this condition and the management approach to it (including a comprehensive discussion of the known risks, side effects and potential benefits of treatment), the patient (family) agrees to implement the following specific plan:  When outside we recommend using a wide brim hat, sunglasses, long sleeve and pants, sunscreen with SPF 30+ with reapplication every 2 hours, or SPF specific clothing   Benign, reassured  Annual skin check     Melanocytic Nevi  Melanocytic nevi (\"moles\") are tan or brown, raised or flat areas of the skin which have an increased number of melanocytes. Melanocytes are the cells in our body which make pigment and account for skin color.    Some moles are present at birth (I.e., \"congenital nevi\"), while others come up later in life (i.e., \"acquired nevi\").  The sun can stimulate the body to make more moles.  Sunburns are not the only thing that triggers more moles.  Chronic sun exposure can do it too.     Clinically distinguishing a healthy mole from melanoma may be difficult, even for experienced dermatologists. The \"ABCDE's\" of moles have been suggested as a means of helping to alert a person to a suspicious mole and the possible increased risk of melanoma.  The suggestions for raising alert are as follows:    Asymmetry: Healthy moles tend to be symmetric, while melanomas are often asymmetric.  Asymmetry means if you draw a line through the mole, the two halves do not match in color, size, shape, or surface texture. Asymmetry can be a result of rapid enlargement of a mole, the development of a raised area on a previously flat lesion, scaling, ulceration, bleeding or scabbing within the mole.  Any mole that starts to demonstrate \"asymmetry\" should be examined promptly by a board certified dermatologist.     Border: Healthy moles " "tend to have discrete, even borders.  The border of a melanoma often blends into the normal skin and does not sharply delineate the mole from normal skin.  Any mole that starts to demonstrate \"uneven borders\" should be examined promptly by a board certified dermatologist.     Color: Healthy moles tend to be one color throughout.  Melanomas tend to be made up of different colors ranging from dark black, blue, white, or red.  Any mole that demonstrates a color change should be examined promptly by a board certified dermatologist.     Diameter: Healthy moles tend to be smaller than 0.6 cm in size; an exception are \"congenital nevi\" that can be larger.  Melanomas tend to grow and can often be greater than 0.6 cm (1/4 of an inch, or the size of a pencil eraser). This is only a guideline, and many normal moles may be larger than 0.6 cm without being unhealthy.  Any mole that starts to change in size (small to bigger or bigger to smaller) should be examined promptly by a board certified dermatologist.     Evolving: Healthy moles tend to \"stay the same.\"  Melanomas may often show signs of change or evolution such as a change in size, shape, color, or elevation.  Any mole that starts to itch, bleed, crust, burn, hurt, or ulcerate or demonstrate a change or evolution should be examined promptly by a board certified dermatologist.      EPIDERMAL INCLUSION CYST    Physical Exam:  Anatomic Location Affected:  mid upper eyelid on the right  Morphological Description:  3 mm round subcutaneous nodule  Pertinent Positives:  Pertinent Negatives:    Additional History of Present Condition:  around 6 months    Assessment and Plan:  Based on a thorough discussion of this condition and the management approach to it (including a comprehensive discussion of the known risks, side effects and potential benefits of treatment), the patient (family) agrees to implement the following specific plan:  Recommend evaluation by " Opthalmologist.    What are epidermal inclusion cysts?  Epidermal inclusion cysts are the most common, benign cutaneous cysts. There are many different names for epidermal inclusion cysts, including epidermoid cyst, epidermal cyst, infundibular cyst, inclusion cyst, and keratin cyst. These cysts can occur anywhere on the body and typically present as nodules directly underneath the skin. There is often a visible pore or opening in the center. The cysts are freely moveable and can range from a few millimeters to several centimeters in diameter. The center of epidermoid cysts almost always contains keratin, which has a cheesy appearance, and not sebum. They also do not originate from sebaceous glands. Therefore, epidermal inclusion cysts are not the same as sebaceous cysts.    Cysts may remain stable or progressively enlarge over time. There are no reliable predictive factors to tell if an epidermal inclusion cyst will enlarge, become inflamed, or remain quiescent. Infected cysts tend to become larger, turn red, and are more noticeable to the patient. There may be accompanying pain and discomfort.     What causes epidermal inclusion cysts?  Epidermal inclusion cysts often appear out of the blue and are not contagious. They are due to a proliferation of epidermal cells within the dermis and are more common in men than women. They occur more frequently in patients in their 20s to 40s. Epidermal inclusion cysts by themselves are usually not inherited, but they can be hereditary in rare syndromes such as Monroy syndrome, nodular elastosis with cysts and comedones (Favre-Racouchot syndrome), and basal cell nevus syndrome (Gorlin syndrome). Elderly patients with chronic sun-damaged skin areas have a higher likelihood of developing epidermoid cysts. They often occur in areas where hair follicles have been inflamed or repeatedly irritated are more frequent in patients with acne vulgaris. In the  period, they are  "called milia.     Patients on BRAF inhibitors such as imiquimod and cyclosporine have a higher incidence of epidermoid cysts of the face.    How do we diagnose an epidermal inclusion cyst?  Epidermoid inclusion cysts are often diagnosed by history and physical exam. There is usually no need for biopsy prior to removal.  Radiographic and laboratory exams, such as ultrasound studies, are unnecessary and not typically ordered unless the practitioner suspects a genetic condition.    What is the treatment for an epidermal inclusion cyst?  Inflamed, uninfected epidermal inclusion cysts rarely resolve spontaneously without therapy or surgical intervention. Treatment is not emergent unless desired by the patient.     Definitive treatment is via surgical excision with walls intact. This method will prevent recurrence. This is best done when the cyst is not inflamed, to decrease the probability of rupture during surgery.   A local anesthetic will be injected around the cyst  A small incision is made in the skin overlying the cyst, and contents are expressed  The incision is repaired with sutures    Another option is to use a 4mm punch biopsy with cyst extraction through the defect.    Incision and drainage is often needed if the cyst is infected or inflamed. If there is surrounding cellulitis, oral antibiotic therapy may be necessary. The common agents used target methicillin sensitive Staphylococcal aureus and methicillin resistant S aureus in areas of high prevalence.     ACROCHORDON (\"SKIN TAG\")    Physical Exam:  Anatomic Location Affected:  right eyelid  Morphological Description:  brown skin colored papule  Pertinent Positives:  Pertinent Negatives:    Additional History of Present Condition:  not contributory    Assessment and Plan:  Based on a thorough discussion of this condition and the management approach to it (including a comprehensive discussion of the known risks, side effects and potential benefits of " treatment), the patient (family) agrees to implement the following specific plan:  Reassured benign.  Insurance does not cover for removal. If desire can be remove for a cosmetic fee of $20.00 each per lesion.    Skin tags are common, soft, harmless skin lesions that are also called, in the appropriate settings, papillomas, fibroepithelial polyps, and soft fibromas.  They are made up of loosely arranged collagen fibers and blood vessels surrounded by a thickened or thinned-out epidermis.    Skin tags tend to develop in both men and women as we grow older.  They are usually found on the skin folds (neck, armpits, groin).  It is not known what specifically causes skin tags.  Certain factors, though, do appear to play a role:  Chaffing and irritation from skin rubbing together  High levels of growth factors (as seen, for example, in pregnancy or in acromegaly/gigantism)  Insulin resistance  Human papillomavirus (wart virus)    We discussed that most skin tags do not need to be treated unless they are specifically causing the patient physical distress or limitation or pose a risk for a larger problem such as an infection that forms secondary to excoriation or chronic irritation.    We had a thorough discussion of treatment options and specific risks (including that any procedural treatment may not be covered by insurance and would then be the patient's responsibility) and benefits/alternatives including but not limited to the following:  Cryotherapy (freezing)  Shave removal  Surgical excision (snip excision with scissors)  Electrosurgery  Ligation (we do not do this procedure and counseled against it due to risk of tissue necrosis and infection)    ROSACEA    Physical Exam:  Anatomic Location Affected:  nasal tip  Morphological Description:  clear of papules at present, mild erythema and telangiectasia  Pertinent Positives:  Pertinent Negatives:    Additional History of Present Condition:  history of recurring pimple  comes and goes 3 times a year, picks it and takes several weeks to heal    Assessment and Plan:  Based on a thorough discussion of this condition and the management approach to it (including a comprehensive discussion of the known risks, side effects and potential benefits of treatment), the patient (family) agrees to implement the following specific plan:  Metronidazole 0.75% cream apply topically to the nasal tip 2 times daily  Use moisturizer daily to improve skin barrier function    Rosacea is a chronic rash affecting the mid-face including the nose, cheeks, chin, forehead, and eyelids. The incidence is usually greatest between the ages of 30-60 years and is more common in people with fair skin. Common characteristics include redness, telangiectasias, papules and pustules over affected areas. Rosacea may look similar to acne, but there is a lack of comedones. Occasionally the eyes may also be involved in ocular rosacea. In advanced disease, enlargement of the sebaceous glands in the nose, termed rhinophyma, may be present.     Rosacea results in red spots (papules) and sometimes pustules over the face, but unlike acne there are no blackheads, whiteheads, or cystic nodules. Patients often experience increased facial flushing with prominent blood vessels (erythematotelangiectatic rosacea) and dry, sensitive skin. These symptoms are exacerbated by sun exposure, hot or spicy foods, topical steroids and oil-based facial products.     In ocular rosacea, eyelids may be red and sore due to conjunctivitis, keratitis, and episcleritis. If rhinophyma develops due to enlargement of sebaceous glands, the patient may have an enlarged and irregularly shaped nose with prominent pores. In rosacea that is refractory to treatment, patients can develop persistent redness and swelling of the face due to lymphatic obstruction (Morbihan disease).     Distribution around the cheeks may be confused with the malar or “butterfly rash” of  lupus. However, the rash of lupus spares the nasal creases and lacks papules and pustules. If signs of photosensitivity, oral ulcers, arthritis, and kidney dysfunction are present then consider referral to a rheumatologist.     There are many potential causes of rosacea including genetic, environmental, vascular, and inflammatory factors. These include, but are not limited to:  Chronic exposure to ultraviolet radiation   Increased immune responses in the form of cathelicidins that promote vessel dilation and infiltration with white blood cells (neutrophils) into the dermis  Increased matrix metalloproteinases such as collagen and elastase that remodel normal tissue may contribute to inflammation of the skin making it thicker and harder  There is some evidence to suggest that increased numbers of demodex mites on patient skin may contribute to rosacea papules     General Treatment Approach   Avoid exacerbating factors such as heat, spicy foods, and alcohol   Use daily SPF30+ sunscreen and other methods of coverage for sun protection  Use water-based make-up   Avoid applying topical steroids to affected areas as they can cause perioral dermatitis and exacerbate rosacea     Topical Treatment Approach  Metronidazole cream or gel by itself or in combination with oral antibiotics for more severe cases  Azelaic acid cream or lotion is effective for mild inflammatory rosacea when applied twice daily to affected areas  Brimonidine gel and oxymetazoline hydrochloride cream can reduce facial redness temporarily   Ivermectin cream can treat papulopustular rosacea by controlling demodex mites and inflammation   Pimecrolimus cream or tacrolimus ointment twice a day for 2-3 months can help reduce inflammation    Oral Treatment Approach  Antibiotics such as doxycycline, minocycline, or erythromycin for 1-3 months  Clonidine and carvedilol can help reduce facial flushing and are generally well tolerated. Common side effects include  low blood pressure, gastrointestinal upset, dry eyes, blurred vision and low heart rate.   Isotretinoin at low doses can be effective for long term treatment when antibiotics fail. Side effects may make it unsuitable for some patients.   NSAIDs such as diclofenac can help reduce discomfort and redness in the skin.     Procedural/Surgical Treatment Approach   Vascular lasers or intense pulsed light treatment may be used to treat persistent telangiectasia and papulopustular rosacea  Plastic surgery and carbon dioxide lasers may be used to treat rhinophyma       LENTIGO    Physical Exam:  Anatomic Location Affected:  trunk, arms  Morphological Description:  Light brown macules  Pertinent Positives:  Pertinent Negatives:    Additional History of Present Condition:      Assessment and Plan:  Based on a thorough discussion of this condition and the management approach to it (including a comprehensive discussion of the known risks, side effects and potential benefits of treatment), the patient (family) agrees to implement the following specific plan:  When outside we recommend using a wide brim hat, sunglasses, long sleeve and pants, sunscreen with SPF 30+ with reapplication every 2 hours, or SPF specific clothing       What is a lentigo?  A lentigo is a pigmented flat or slightly raised lesion with a clearly defined edge. Unlike an ephelis (freckle), it does not fade in the winter months. There are several kinds of lentigo.  The name lentigo originally referred to its appearance resembling a small lentil. The plural of lentigo is lentigines, although “lentigos” is also in common use.    Who gets lentigines?  Lentigines can affect males and females of all ages and races. Solar lentigines are especially prevalent in fair skinned adults. Lentigines associated with syndromes are present at birth or arise during childhood.    What causes lentigines?  Common forms of lentigo are due to exposure to ultraviolet radiation:  Sun  "damage including sunburn   Indoor tanning   Phototherapy, especially photochemotherapy (PUVA)    Ionizing radiation, eg radiation therapy, can also cause lentigines.  Several familial syndromes associated with widespread lentigines originate from mutations in Art-MAP kinase, mTOR signaling and PTEN pathways.    What is the treatment for lentigines?  Most lentigines are left alone. Attempts to lighten them may not be successful. The following approaches are used:  SPF 50+ broad-spectrum sunscreen   Hydroquinone bleaching cream   Alpha hydroxy acids   Vitamin C   Retinoids   Azelaic acid   Chemical peels  Individual lesions can be permanently removed using:  Cryotherapy   Intense pulsed light   Pigment lasers    How can lentigines be prevented?  Lentigines associated with exposure ultraviolet radiation can be prevented by very careful sun protection. Clothing is more successful at preventing new lentigines than are sunscreens.    What is the outlook for lentigines?  Lentigines usually persist. They may increase in number with age and sun exposure. Some in sun-protected sites may fade and disappear.    TEMPLETON ANGIOMAS    Physical Exam:  Anatomic Location Affected:  trunk  Morphological Description:  Scattered cherry red, 1-4 mm papules.  Pertinent Positives:  Pertinent Negatives:    Additional History of Present Condition:      Assessment and Plan:  Based on a thorough discussion of this condition and the management approach to it (including a comprehensive discussion of the known risks, side effects and potential benefits of treatment), the patient (family) agrees to implement the following specific plan:  Monitor for changes  Benign, reassured      Assessment and Plan:    Cherry angioma, also known as Leonardo de Rajeev spots, are benign vascular skin lesions. A \"cherry angioma\" is a firm red, blue or purple papule, 0.1-1 cm in diameter. When thrombosed, they can appear black in colour until evaluated with a " "dermatoscope when the red or purple colour is more easily seen. Cherry angioma may develop on any part of the body but most often appear on the scalp, face, lips and trunk.  An angioma is due to proliferating endothelial cells; these are the cells that line the inside of a blood vessel.    Angiomas can arise in early life or later in life; the most common type of angioma is a cherry angioma.  Cherry angiomas are very common in males and females of any age or race. They are more noticeable in white skin than in skin of colour. They markedly increase in number from about the age of 40. There may be a family history of similar lesions. Eruptive cherry angiomas have been rarely reported to be associated with internal malignancy. The cause of angiomas is unknown. Genetic analysis of cherry angiomas has shown that they frequently carry specific somatic missense mutations in the GNAQ and GNA11 (Q209H) genes, which are involved in other vascular and melanocytic proliferations.      SEBORRHEIC KERATOSIS; NON-INFLAMED    Physical Exam:  Anatomic Location Affected:  trunk  Morphological Description:  Flat and raised, waxy, smooth to warty textured, yellow to brownish-grey to dark brown to blackish, discrete, \"stuck-on\" appearing papules.  Pertinent Positives:  Pertinent Negatives:    Additional History of Present Condition:      Assessment and Plan:  Based on a thorough discussion of this condition and the management approach to it (including a comprehensive discussion of the known risks, side effects and potential benefits of treatment), the patient (family) agrees to implement the following specific plan:  Monitor for changes  Benign, reassured      Seborrheic Keratosis  A seborrheic keratosis is a harmless warty spot that appears during adult life as a common sign of skin aging.  Seborrheic keratoses can arise on any area of skin, covered or uncovered, with the usual exception of the palms and soles. They do not arise from " "mucous membranes. Seborrheic keratoses can have highly variable appearance.      Seborrheic keratoses are extremely common. It has been estimated that over 90% of adults over the age of 60 years have one or more of them. They occur in males and females of all races, typically beginning to erupt in the 30s or 40s. They are uncommon under the age of 20 years.  The precise cause of seborrhoeic keratoses is not known.  Seborrhoeic keratoses are considered degenerative in nature. As time goes by, seborrheic keratoses tend to become more numerous. Some people inherit a tendency to develop a very large number of them; some people may have hundreds of them.      There is no easy way to remove multiple lesions on a single occasion.  Unless a specific lesion is \"inflamed\" and is causing pain or stinging/burning or is bleeding, most insurance companies do not authorize treatment.      Scribe Attestation    I,:  Jessica Hodgson am acting as a scribe while in the presence of the attending physician.:       I,:  Richelle Davila MD personally performed the services described in this documentation    as scribed in my presence.:          "

## 2024-06-14 ENCOUNTER — HOSPITAL ENCOUNTER (OUTPATIENT)
Dept: RADIOLOGY | Facility: HOSPITAL | Age: 69
Discharge: HOME | End: 2024-06-14
Payer: COMMERCIAL

## 2024-06-14 DIAGNOSIS — R06.02 SHORTNESS OF BREATH: ICD-10-CM

## 2024-06-14 PROCEDURE — 71046 X-RAY EXAM CHEST 2 VIEWS: CPT

## 2024-11-22 ENCOUNTER — TELEPHONE (OUTPATIENT)
Age: 69
End: 2024-11-22

## 2024-11-22 NOTE — TELEPHONE ENCOUNTER
Pt called stating she a script in hand for a home sleep study from her pulmonologist who is not with [facility].      I confirmed with Analisa at the office that the script will need to faxed to us.    Provided fax # [phone] to pt. Advised once its processed the  staff will call to proceed with scheduling. Pt understood.

## 2024-12-12 ENCOUNTER — TRANSCRIBE ORDERS (OUTPATIENT)
Dept: SLEEP CENTER | Facility: CLINIC | Age: 69
End: 2024-12-12

## 2024-12-12 DIAGNOSIS — G47.33 OSA (OBSTRUCTIVE SLEEP APNEA): Primary | ICD-10-CM

## 2024-12-12 DIAGNOSIS — R06.83 SNORING: Primary | ICD-10-CM

## 2024-12-12 DIAGNOSIS — R06.83 SNORING: ICD-10-CM

## 2024-12-12 DIAGNOSIS — G47.09 OTHER INSOMNIA: ICD-10-CM

## 2025-01-22 ENCOUNTER — HOSPITAL ENCOUNTER (OUTPATIENT)
Dept: SLEEP CENTER | Facility: CLINIC | Age: 70
Discharge: HOME | End: 2025-01-22
Payer: COMMERCIAL

## 2025-01-22 DIAGNOSIS — G47.33 OSA (OBSTRUCTIVE SLEEP APNEA): ICD-10-CM

## 2025-01-22 PROCEDURE — G0399 HOME SLEEP TEST/TYPE 3 PORTA: HCPCS

## 2025-01-23 NOTE — PROGRESS NOTES
Home Sleep Study Documentation    HOME STUDY DEVICE: Noxturnal no                                           Anastasia G3 yes      Pre-Sleep Home Study:    Set-up and instructions performed by: LAMONT Hurd    Technician performed demonstration for Patient: yes    Return demonstration performed by Patient: yes    Written instructions provided to Patient: yes    Patient signed consent form: yes        Post-Sleep Home Study:    Additional comments by Patient: pending    Home Sleep Study Failed:pending    Failure reason: pending    Reported or Detected: pending    Scored by: pending

## 2025-01-27 PROBLEM — G47.33 OSA (OBSTRUCTIVE SLEEP APNEA): Status: ACTIVE | Noted: 2025-01-27

## 2025-01-31 ENCOUNTER — TELEPHONE (OUTPATIENT)
Dept: SLEEP CENTER | Facility: CLINIC | Age: 70
End: 2025-01-31

## 2025-01-31 NOTE — TELEPHONE ENCOUNTER
Hi, my name is Cristal Dumont. My birthday is 1955. My phone number is [phone]. I did an at home sleep study on January the 22nd and I don't see any results like in my chart or I didn't get any emails or anything like that so I was calling to find out what's next. Thank you.  ---------------------------------------------------    Received call from patient requesting results of sleep study.    Returned call and advised patient that sleep study results are still pending and can take approximately 2 weeks to become available.  They will receive notification via [employer] when study has been read.  Either the ordering provider or sleep center nursing staff will then reach out to review results and recommendations.

## 2025-02-25 NOTE — PROGRESS NOTES
"Weight Management Medical Nutrition Assessment  Sun is here today for medical menu planning. Current weight ***#.      - on statin, little PMH available via EMR      - [facility]?  -       Patient seen by Medical Provider in past 6 months:  no  Requested to schedule appointment with Medical Provider: {YES/NO:61432}    Anthropometric Measurements  Program Start Weight (#): ***# (2/26/25)  Current Weight (#): --#  TBW % Change from start weight: --%  IBW: ***# (***\")  Goal Weight (#): STG: *** LTG: ***  Highest Weight (#): ***#  Lowest Weight (#): ***#    Weight Loss History  Previous weight loss attempts: {NUT Weight Loss Methods:87040}    Food and Nutrition Related History  Wake up: ***   Bed Time: ***  Sleep quality: ***    Dietary Recall  Breakfast: ***    Snack: ***  Lunch: ***  Snack: ***  Dinner: ***  Snack: ***    Beverages: ***  Volume of beverage intake: ***    Weekends: ***  Cravings: ***  Trouble area of day: ***    Frequency of Eating out: ***  Food restrictions: ***  Cooking: ***  Food Shopping: ***    Occupation: ***    Physical Activity  Activity: ***  Frequency: ***  Physical limitations/barriers to exercise: ***    Estimated Needs  [place] [employer] Needs (needs at ***#)  BMR: *** kcal  Maintenance calories (sedentary): *** kcal  1-2#/week loss (sedentary): *** kcal  1-2#/week loss (light activity): *** kcal    Protein: *** grams (1.2-1.5g/kg IBW)  Total Fluid: *** ounces (Douglas-Segar Method) [ABW ***#]  Free Fluid: *** ounces (Douglas-Segar Method - 20%)    Nutrition Diagnosis  {New PES:54304}    Nutrition Intervention    Nutrition Prescription  Calories: *** kcal  Protein: *** g  Fluid: *** ounces    Meal Plan (Tom/Pro)  Breakfast: ***  Snack:   Lunch:   Snack:   Dinner:   Snack:     Nutrition Education  Calorie controlled menu  Lean protein food choices  Healthy snack options  Appropriate portions  Appropriate meal spacing    Nutrition Counseling  Strategies: motivational interviewing, goal " setting, personalized meal planning    Monitoring and Evaluation:    Evaluation criteria  Energy and protein intake  Fluid intake  Monitor weekly weight  Meal planning/preparation  Calorie tracking/Measuring  Portion control   Physical activity     Barriers to change:{[facility] AM[facility] DSMT/MNT BARRIERS TO LEARNIN}  Readiness to change: {Readiness to Change:24245}  Comprehension: {PATIENT'S RESPONSE COMPREHENSION:5331936221}  Expected Compliance: {PATIENT'S RESPONSE EXPECTED COMPLIANCE:0014352596}

## 2025-02-26 ENCOUNTER — CLINICAL SUPPORT (OUTPATIENT)
Dept: BARIATRICS | Facility: CLINIC | Age: 70
End: 2025-02-26

## 2025-02-26 VITALS — HEIGHT: 64 IN | WEIGHT: 179.6 LBS | BODY MASS INDEX: 30.66 KG/M2

## 2025-02-26 DIAGNOSIS — R63.5 ABNORMAL WEIGHT GAIN: Primary | ICD-10-CM

## 2025-02-26 PROCEDURE — RECHECK

## 2025-02-26 PROCEDURE — WMDI30

## 2025-02-26 NOTE — PROGRESS NOTES
"Weight Management Medical Nutrition Assessment  Sun is here today for medical menu planning. Current weight 179.6#.  Pt reports she wants to continue to be healthy and make sure she is doing things right.  She has had some recent SOB which she is being worked up for. She saw cardio and was started on lasix. 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 She currently works with a  2x/week for the past year, focusing on resistance training. Pt brought in her [employer] body comp scans and noted from jan to Sept 2024 pt lost 5# (7.3# fat mass and gained 1.8# muscle mass).  She was was doing spin classes in the past, but not so much lately due to SOB with exercise.  With the new meds on board she is thinking of restarting cardio classes.  Pt is pescatarian because red meat and chicken were causing arthritic pain. She also avoids artificial sweeteners. Reviewed diet recall and pt appears to be making very healthy food choices, but she is likely over eating her calories for weight loss. Pt reports her  advised she need to eat garcia calories.  Discussed with pt that you really can't lose fat and gain muscle at the same time, therefore should focus on the ashleigh loss at this time.  Did some menu planning focusing on protein, providing vegan and vegetarian options, and teasing out where she may be eating the extra calories.      Patient seen by Medical Provider in past 6 months:  no  Requested to schedule appointment with Medical Provider: No    Anthropometric Measurements  Program Start Weight (#): 179.6# (2/26/25)  Current Weight (#): --#  TBW % Change from start weight: --%  IBW: 120# (64\")  Goal Weight (#): 150     Weight Loss History  Previous weight loss attempts: Commercial Programs (Weight Watchers, [employer], etc.)  Exercise  Self Created Diets (Portion Control, Healthy Food Choices, etc.)    Food and Nutrition Related History  Sleep " quality: 8-9 hrs of sleep  Mild RAFAEL on sleep study, but likely not need CPAP per pulm    Dietary Recall  Breakfast: 8am-3 eggs + 1 egg white + veggies + 1/2 slice of bread + butter on bread and in pan OR greek [employer]ik[employer] triple zero OR Overnight oats (with 1/2 cup sprouted oats, tamra seeds, 1/2 cup greek yogurt, 1/2c almond milk, 1 tsp PB, 1 tsp honey, ground flax + raw organic plant protein powder) w/blueberries + 1/2 banana (every other day)   10:45am at the gym-- Monday and Wednesday with , wants to get back to spin classes   Snack: sometimes   Lunch: 1pm-cottage cheese (good brand) with fruit OR greek yogurt with fruit and ~ 1/4 cup granola OR tuna mixed with avocado edwards + celery on 1/2 light english muffin or slice of sourdough OR sardines out of can with crackers  OR Remedy shake (170 cals, 12gm protein) that has about 12gm pro.  Snack: raw nuts will pick on OR dried fruit OR bread sticks  Dinner: 6p-stir cardenas with tofu OR salmon with veggies OR pasta OR meal from hospital OR stuffed pepper with beans and rice instead of meat OR bean and cheese burritos.   Snack: pudding made with skim milk (1/2 cup) OR Yasso bar     Beverages: water with electrolyte, when work out put in BCAA, coffee with salt + 2tsp raw sugar + 2 scoops of collagen peptides + a little creamer 1-2tbsp   Volume of beverage intake: 6 cups of water per day,     Weekends: same  Cravings: occ for salt or dark chocolate  Trouble area of day: -    Frequency of Eating out: 2x/month   Food restrictions: avoids meat and poultry (pescatarian), avoids artifical sweeteners  Cooking:   Food Shopping:     Occupation: retired     Vitamins:  Pre and probiotics    Physical Activity  Activity: works with  2x/week, started back to horse back riding, wants to get back to her spin classes.   Frequency: 2 days for sure  Physical limitations/barriers to exercise: -    Estimated Needs  [employer] Needs (needs at 179#)  BMR: 1312  kcal  Maintenance calories (sedentary): 1574 kcal  1-2#/week loss (sedentary): 574-1074 kcal  1/2# per week sedentary:  1324 cals  1-2#/week loss (light activity): 804-1304 kcal  1/2# wt loss light activity:  1554 cals    Protein: 65-82 grams (1.2-1.5g/kg IBW)  Total Fluid: 2324 ml ([place]-Segar Method) [#]  Free Fluid: 1859 ml ([place]-Segar Method - 20%)    Nutrition Diagnosis  Yes;    Overweight/obesity  related to Excess energy intake as evidenced by  BMI more than normative standard for age and sex (obesity-grade I 30-34.9)       Nutrition Intervention    Nutrition Prescription  Calories: 1300 kcal for 1/2# (sedentary) to 1# wt loss (light activity--adding in cardio workouts)/wk   Advised if went up to 1500 cals then would lose 1/2# per wk if adding in the extra cardio workouts.  Protein: 80 g  Fluid: 1860 ml    Meal Plan (Tom/Pro)  Breakfast: 300-450 (can go to 450 if doesn't have mid-morning snack)  Snack: optional 150cal  Lunch: 300  Snack: 150-200  Dinner: 400  Snack: 100-150    Nutrition Education  Calorie controlled menu  Lean protein food choices  Healthy snack options  Appropriate portions  Appropriate meal spacing    Nutrition Counseling  Strategies: motivational interviewing, goal setting, personalized meal planning    Monitoring and Evaluation:    Evaluation criteria  Energy and protein intake  Fluid intake  Monitor weekly weight  Meal planning/preparation  Calorie tracking/Measuring  Portion control   Physical activity     Barriers to change:none  Readiness to change: Action:  (Changing behavior)  Comprehension: good  Expected Compliance: good

## 2025-03-06 ENCOUNTER — CLINICAL SUPPORT (OUTPATIENT)
Dept: BARIATRICS | Facility: CLINIC | Age: 70
End: 2025-03-06

## 2025-03-06 VITALS — WEIGHT: 174.2 LBS | BODY MASS INDEX: 29.74 KG/M2 | HEIGHT: 64 IN

## 2025-03-06 DIAGNOSIS — R63.5 ABNORMAL WEIGHT GAIN: Primary | ICD-10-CM

## 2025-03-06 PROCEDURE — WMDI30

## 2025-03-06 PROCEDURE — RECHECK

## 2025-03-06 NOTE — PROGRESS NOTES
"Weight Management Medical Nutrition Assessment  Sun is here today for medical menu planning. Current weight 174.2# which is a 5.5# wt loss int he past week (3% TBW).  Pt has started to make many of the changes that we talked about. She is now noticing where she was likely consuming too many calories. She is now measuring her creamer and sugar in her coffee, is snacking less often and better meeting her protein needs.  Pt wanted another appt before she went away for a road trip to [place] to discuss how to best manage the eating out and food on the road. Suggested to pack a cooler with snacks, reviewed tips for went eating out and encouraged activity.  Pt jessee f/u in 2 week after she returns from her trip.    Patient seen by Medical Provider in past 6 months:  no  Requested to schedule appointment with Medical Provider: No    Anthropometric Measurements  Program Start Weight (#): 179.6# (2/26/25)  Current Weight (#): 174.2#  TBW % Change from start weight: 3%  IBW: 120# (64\")  Goal Weight (#): 150     Weight Loss History  Previous weight loss attempts: Commercial Programs (Weight Watchers, [employer] etc.)  Exercise  Self Created Diets (Portion Control, Healthy Food Choices, etc.)    Food and Nutrition Related History  Sleep quality: 8-9 hrs of sleep  Mild RAFAEL on sleep study, but likely not need CPAP per pulm    Dietary Recall  Logging in the lose it marycruz-about 2163-3633 calories. Most often the percentages were good with about 40% carbs, 25% protein, 35% fat.   Breakfast: 8am-3 eggs + 1 egg white + veggies + 1/2 slice of bread + butter on bread and in pan OR Overnight oats (with 1/2 cup sprouted oats, tamra seeds, 1/2 cup greek yogurt, 1/2c almond milk, 1 tsp PB, 1 tsp honey, ground flax + raw organic plant protein powder) w/blueberries. Is now measuring her sugar and creamer in her coffee.   10:45am at the gym-- Monday and Wednesday with , did start walking more and went to spin once and felt really good. "    Snack: trying ideas from the handout provided.  Lunch: 1pm-cottage cheese (good culture) with fruit OR greek yogurt with fruit and ~ 1/4 cup granola OR tuna mixed with avocado edwards + celery on 1/2 light english muffin or slice of sourdough OR sardines out of can with crackers  OR Remedy shake (170 cals, 12gm protein) when on the go.  Snack: raw nuts will pick on OR dried fruit OR bread sticks  Dinner: 6p-stir cardenas with tofu OR salmon with veggies OR pasta OR stuffed pepper with beans and rice instead of meat OR bean and cheese burritos.   Snack: pudding made with skim milk (1/2 cup) OR Yasso bar     Beverages: water with electrolyte, when work out put in BCAA, coffee with salt +now measuring cream and sugar so using less + 2 scoops of collagen peptides + a little creamer 1-2tbsp   Volume of beverage intake: 6 cups of water per day,     Weekends: same  Cravings: occ for salt or dark chocolate  Trouble area of day: -    Frequency of Eating out: 2x/month   Food restrictions: avoids meat and poultry (pescatarian), avoids artifical sweeteners and stevia  Cooking:   Food Shopping:     Occupation: retired     Vitamins:  Pre and probiotics    Physical Activity  Activity: works with  2x/week, started back to horse back riding, wants to get back to her spin classes.   Frequency: 2 days for sure--in the past week went to spin once a week and started walking.  Physical limitations/barriers to exercise: -    Estimated Needs  [employer] Energy Needs (needs at 179#)  BMR: 1312 kcal  Maintenance calories (sedentary): 1574 kcal  1-2#/week loss (sedentary): 574-1074 kcal  1/2# per week sedentary:  1324 cals  1-2#/week loss (light activity): 804-1304 kcal  1/2# wt loss light activity:  1554 cals    Protein: 65-82 grams (1.2-1.5g/kg IBW)  Total Fluid: 2324 ml ([place]-Segar Method) [#]  Free Fluid: 1859 ml (Evel[place]-Segar Method - 20%)    Nutrition Diagnosis  Yes;    Overweight/obesity  related to  Excess energy intake as evidenced by  BMI more than normative standard for age and sex (obesity-grade I 30-34.9)       Nutrition Intervention    Nutrition Prescription  Calories: 1300 kcal for 1/2# (sedentary) to 1# wt loss (light activity--adding in cardio workouts)/wk   Advised if went up to 1500 cals then would lose 1/2# per wk if adding in the extra cardio workouts.  Protein: 80 g  Fluid: 1860 ml    Meal Plan (Tom/Pro)  Breakfast: 300-450 (can go to 450 if doesn't have mid-morning snack)  Snack: optional 150cal  Lunch: 300  Snack: 150-200  Dinner: 400  Snack: 100-150    Nutrition Education  Calorie controlled menu  Lean protein food choices  Healthy snack options  Appropriate portions  Appropriate meal spacing    Nutrition Counseling  Strategies: motivational interviewing, goal setting, personalized meal planning    Monitoring and Evaluation:    Evaluation criteria  Energy and protein intake  Fluid intake  Monitor weekly weight  Meal planning/preparation  Calorie tracking/Measuring  Portion control   Physical activity     Barriers to change:none  Readiness to change: Action:  (Changing behavior)  Comprehension: good  Expected Compliance: good

## 2025-03-20 ENCOUNTER — CLINICAL SUPPORT (OUTPATIENT)
Dept: BARIATRICS | Facility: CLINIC | Age: 70
End: 2025-03-20

## 2025-03-20 VITALS — BODY MASS INDEX: 29.47 KG/M2 | HEIGHT: 64 IN | WEIGHT: 172.6 LBS

## 2025-03-20 DIAGNOSIS — R63.5 ABNORMAL WEIGHT GAIN: Primary | ICD-10-CM

## 2025-03-20 PROCEDURE — DB3PK

## 2025-03-20 PROCEDURE — RECHECK

## 2025-03-20 NOTE — PROGRESS NOTES
"Weight Management Medical Nutrition Assessment  Sun is here today for 1 of 3 RD bundle. Current weight 172.6# which is a 1.6# wt loss int he past 2 weeks  (4% TBW).  Pt was had to drive out to [place]. She did log most days while there, was more mindful of choices and packed some snacks for the car.  Pt still reports to be overwhelmed and would like to be losing more weight. Reminded pt that she has lost 7# since starting here about 3 weeks ago, which is on average 2# per week. She continues to keep a log and believes she is getting too many carbs.  There are days she is closer to 170gm carbs, opposed to the suggested 140gm. Reviewed the log that specific day and noted pt had a pre- workout snack that was 1 rice cake, banana and PB which was 340 cals. Advised pt it became more of a meal rather than a snack. Suggested either 1/2 the banana with PB or the rice cake with PB, but don't believe she needs more just before her workout. Pt states she will try. Pt wants to review supplements for menopause.  She questioned creatine, reports she currently adds BCAA, collagen, pre and probiotics to her drink for after a workout. Will review with pt at next visit in 2 weeks.     Patient seen by Medical Provider in past 6 months:  no  Requested to schedule appointment with Medical Provider: No    Anthropometric Measurements  Program Start Weight (#): 179.6# (2/26/25)  Current Weight (#): 172.6#  TBW % Change from start weight: 4% (7.1#)  IBW: 120# (64\")  Goal Weight (#): 150     Weight Loss History  Previous weight loss attempts: Commercial Programs (Weight Watchers, [email or web address], etc.)  Exercise  Self Created Diets (Portion Control, Healthy Food Choices, etc.)    Food and Nutrition Related History  Sleep quality: 8-9 hrs of sleep  Mild RAFAEL on sleep study, but likely not need CPAP per pulm    Dietary Recall  Logging in the lose it marycruz-about 7427-8065 calories.   Breakfast: 8am-3 eggs (suggested to try 1 egg + 2-3 egg whites for less " cals and more protein) + veggies + 1/2 slice of bread + butter on bread and in pan OR Overnight oats (with 1/2 cup sprouted oats, tamra seeds, 1/2 cup greek yogurt, 1/2c almond milk, 1 tsp PB, 1 tsp honey, ground flax + raw organic plant protein powder) w/blueberries. Is now measuring her sugar (1tbsp) and creamer (less than what she was using) in her coffee OR tamra pudding with or w/o greek yogurt for added protein  Yesterday had Banana + rice cake + PB pre-workout =340cals--suggested either rice cake or banana, not both  10:45am at the gym-- Monday and Wednesday with , did start walking more and went to spin once and felt really good.    Snack: trying ideas from the handout provided.  Lunch: 1pm-cottage cheese (good culture) with fruit OR greek yogurt with fruit and ~ 1/4 cup granola OR tuna mixed with avocado edwards + celery on 1/2 light english muffin or slice of sourdough OR sardines out of can with crackers OR Remedy shake (170 cals, 12gm protein) when on the go.  Snack: raw nuts will pick on OR dried fruit OR bread sticks  Dinner: 6p-stir cardenas with tofu OR salmon with veggies OR pasta OR stuffed pepper with beans and rice instead of meat OR bean and cheese burritos OR cod with tomatoes + lemon juice   Snack: pudding made with skim milk (1/2 cup) OR Yasso bar     Beverages: water with electrolyte, when work out put in BCAA, coffee with salt +now measuring cream and sugar so using less + 2 scoops of collagen peptides + a little creamer 1-2tbsp   Volume of beverage intake: 6 cups of water per day,     Weekends: same  Cravings: occ for salt or dark chocolate  Trouble area of day: -    Frequency of Eating out: 2x/month   Food restrictions: avoids meat and poultry (pescatarian), avoids artifical sweeteners and stevia  Cooking:   Food Shopping:     Occupation: retired     Vitamins:  Pre and probiotics    Physical Activity  Activity: works with  2x/week, started back to horse back riding,  wants to get back to her spin classes.   Frequency: 2 days for sure--in the past week went to spin once a week and started walking.  Physical limitations/barriers to exercise: -    Estimated Needs  [employer] Energy Needs (needs at 179#)  BMR: 1312 kcal  Maintenance calories (sedentary): 1574 kcal  1-2#/week loss (sedentary): 574-1074 kcal  1/2# per week sedentary:  1324 cals  1-2#/week loss (light activity): 804-1304 kcal  1/2# wt loss light activity:  1554 cals    Protein: 65-82 grams (1.2-1.5g/kg IBW)  Total Fluid: 2324 ml (Evelia-Segar Method) [#]  Free Fluid: 1859 ml (Evelia-Segar Method - 20%)    Nutrition Diagnosis  Yes;    Overweight/obesity  related to Excess energy intake as evidenced by  BMI more than normative standard for age and sex (obesity-grade I 30-34.9)       Nutrition Intervention    Nutrition Prescription  Calories: 1300 kcal for 1/2# (sedentary) to 1# wt loss (light activity--adding in cardio workouts)/wk   Advised if went up to 1500 cals then would lose 1/2# per wk if adding in the extra cardio workouts.  Protein: 80 g  Fluid: 1860 ml    Meal Plan (Tom/Pro)  Breakfast: 300-450 (can go to 450 if doesn't have mid-morning snack)  Snack: optional 150cal  Lunch: 300  Snack: 150-200  Dinner: 400  Snack: 100-150    Nutrition Education  Calorie controlled menu  Lean protein food choices  Healthy snack options  Appropriate portions  Appropriate meal spacing    Nutrition Counseling  Strategies: motivational interviewing, goal setting, personalized meal planning    Monitoring and Evaluation:    Evaluation criteria  Energy and protein intake  Fluid intake  Monitor weekly weight  Meal planning/preparation  Calorie tracking/Measuring  Portion control   Physical activity     Barriers to change:none  Readiness to change: Action:  (Changing behavior)  Comprehension: good  Expected Compliance: good

## 2025-04-03 ENCOUNTER — CLINICAL SUPPORT (OUTPATIENT)
Dept: BARIATRICS | Facility: CLINIC | Age: 70
End: 2025-04-03

## 2025-04-03 VITALS — BODY MASS INDEX: 29.18 KG/M2 | HEIGHT: 64 IN | WEIGHT: 170.9 LBS

## 2025-04-03 DIAGNOSIS — R63.5 ABNORMAL WEIGHT GAIN: Primary | ICD-10-CM

## 2025-04-03 PROCEDURE — RECHECK

## 2025-04-03 NOTE — PROGRESS NOTES
"Weight Management Medical Nutrition Assessment  Sun is here today for body comp as part of bundle.  Current weight 170.9 # which is a 1.7# wt loss int he past 2 weeks and 8.8# since starting 5 weeks ago (5% TBW).  Pt continues to work on altering her coffee to have less sugar, but she hasn't found the right combo to make it taste good still. She cut out the larger pre-workout snack. Suggested if having a meal within 2hrs of workout, may not need the snack, but if does need the snack to limit to 150 cals. Today we touch on supplements. Pt was asking about creatine. Advised it may be a good supplement to add to her regimen.  Provided with some suggestions.  Pt still feels she isn't losing well, but again reminded her she has lost 5% (8.8#) in 5 weeks which is a good weight loss at this time.    Completed body comp and noted pt with average muscle mass.  She had a body comp completed at her gym but on a different brand scale (In Body) so difficulty to compare. Suggested we can repeat the body comp in 2-3 months.       Patient seen by Medical Provider in past 6 months:  no  Requested to schedule appointment with Medical Provider: No    Anthropometric Measurements  Program Start Weight (#): 179.6# (2/26/25)  Current Weight (#): 170.9#  TBW % Change from start weight: 5% (8.8#)  IBW: 120# (64\")  Goal Weight (#): 150     Weight Loss History  Previous weight loss attempts: Commercial Programs (Weight Watchers, [employer], etc.)  Exercise  Self Created Diets (Portion Control, Healthy Food Choices, etc.)    Food and Nutrition Related History  Sleep quality: 8-9 hrs of sleep  Mild RAFAEL on sleep study, but likely not need CPAP per pulm    Dietary Recall  Logging in the lose it marycruz-about 4671-3953 calories.   Breakfast: 8am-3 eggs + veggies + 1/2 slice of bread + butter on bread and in pan OR Overnight oats (with 1/2 cup sprouted oats, tamra seeds, 1/2 cup greek yogurt, 1/2c almond milk, 1 tsp PB, 1 tsp honey, ground flax + raw " organic plant protein powder) w/blueberries. Is now measuring her sugar (1tbsp) and creamer (less than what she was using) in her coffee *tried monk fruit but didn't like so 1 tbsp of raw sugar + 2 tbsp half + half (40 cals)--still doesn't taste the same, but still trying to find the right balance.  OR tamra pudding with or w/o greek yogurt for added protein  10:45am at the gym-- Monday and Wednesday with  and now added in more cardio and chair yoga   Snack: trying ideas from the handout provided.  Lunch: 1pm-cottage cheese (good culture) with fruit OR greek yogurt with fruit and ~ 1/4 cup granola OR tuna mixed with avocado edwards + celery on 1/2 light english muffin or slice of sourdough OR sardines out of can with crackers OR Remedy shake (170 cals, 12gm protein) when on the go.  Snack: raw nuts will pick on OR dried fruit OR bread sticks  Dinner: 6p-stir cardenas with tofu OR salmon with veggies OR pasta OR stuffed pepper with beans and rice instead of meat OR bean and cheese burritos OR cod with tomatoes + lemon juice   Snack: pudding made with skim milk (1/2 cup) OR Yasso bar or some chocolate    Beverages: water with electrolyte, when work out puts in BCAA, coffee with salt +now measuring cream and sugar so using less + 2 scoops of collagen peptides  Volume of beverage intake: 6 cups of water per day,     Weekends: same  Cravings: occ for salt or dark chocolate  Trouble area of day: -    Frequency of Eating out: 2x/month   Food restrictions: avoids meat and poultry (pescatarian), avoids artifical sweeteners and stevia  Cooking:   Food Shopping:     Occupation: retired     Vitamins:  Pre and probiotics    Physical Activity  Activity: works with  2x/week (Mon + Wed), now 1x/week doing spin (Tues), Thurs chair yoga, Friday started with horseback riding (weather dependant) + 2-2.5 miles walking/jogging 2x/week (Sat + Sun) (overall doing an 18 min mile)  Frequency: 2 days for sure--in the past  week went to spin once a week and started walking.  Physical limitations/barriers to exercise: -    Estimated Needs  [employer] Needs (needs at 179#)  BMR: 1312 kcal  Maintenance calories (sedentary): 1574 kcal  1-2#/week loss (sedentary): 574-1074 kcal  1/2# per week sedentary:  1324 cals  1-2#/week loss (light activity): 804-1304 kcal  1/2# wt loss light activity:  1554 cals    Protein: 65-82 grams (1.2-1.5g/kg IBW)  Total Fluid: 2324 ml ([place]-Segar Method) [#]  Free Fluid: 1859 ml ([place]-Segar Method - 20%)    Nutrition Diagnosis  Yes;    Overweight/obesity  related to Excess energy intake as evidenced by  BMI more than normative standard for age and sex (obesity-grade I 30-34.9)       Nutrition Intervention    Nutrition Prescription  Calories: 1300 kcal for 1/2# (sedentary) to 1# wt loss (light activity--adding in cardio workouts)/wk   Advised if went up to 1500 cals then would lose 1/2# per wk if adding in the extra cardio workouts.  Protein: 80 g  Fluid: 1860 ml    Meal Plan (Tom/Pro)  Breakfast: 300-450 (can go to 450 if doesn't have mid-morning snack)  Snack: optional 150cal  Lunch: 300  Snack: 150-200  Dinner: 400  Snack: 100-150    Nutrition Education  Calorie controlled menu  Lean protein food choices  Healthy snack options  Appropriate portions  Appropriate meal spacing    Nutrition Counseling  Strategies: motivational interviewing, goal setting, personalized meal planning    Monitoring and Evaluation:    Evaluation criteria  Energy and protein intake  Fluid intake  Monitor weekly weight  Meal planning/preparation  Calorie tracking/Measuring  Portion control   Physical activity     Barriers to change:none  Readiness to change: Action:  (Changing behavior)  Comprehension: good  Expected Compliance: good

## 2025-04-08 ENCOUNTER — TELEPHONE (OUTPATIENT)
Age: 70
End: 2025-04-08

## 2025-04-08 NOTE — TELEPHONE ENCOUNTER
Patient calling in and asking to speak with Zena in regards to rescheduling her appt    Informed patient that Zena was at lunch and would send her a message to have her call back patient

## 2025-04-23 ENCOUNTER — CLINICAL SUPPORT (OUTPATIENT)
Dept: BARIATRICS | Facility: CLINIC | Age: 70
End: 2025-04-23

## 2025-04-23 VITALS — WEIGHT: 169.6 LBS | BODY MASS INDEX: 28.95 KG/M2 | HEIGHT: 64 IN

## 2025-04-23 DIAGNOSIS — R63.5 ABNORMAL WEIGHT GAIN: Primary | ICD-10-CM

## 2025-04-23 PROCEDURE — RECHECK

## 2025-04-23 NOTE — PROGRESS NOTES
"Weight Management Medical Nutrition Assessment  Sun is here today for 2 of 3 RD bundle visit.  Current weight 169.6 # which is a 1.3# wt loss in the past 3 weeks. She was away in [place] with her mom who was recovering from a fall. When she got to her mom's house there was a lot of \"junk food\" around the house--cookies out on the counter top, melecio kisses in bowls in every room, and chips in the cabinets. She was very mindful and knew what was going to work for her therefore put away all the candy and cookies in order to help her make mindful decisions while she was there. She did go out to eat more often, but tried to stay in her calorie range and estimate the calories as best as possible.  She also worked with a  2x/week (total 4 times) while she was there.  Pt did do another InBody scan at her gym the same day as the [facility] body comp and noted the differences. Reminded pt can't compare the results from the two scales, but did notice on the InBody from her last InBody scan at the gym she did lose 2# of muscle mass. Reviewed her food log and noted the average protein per day more recently was about 50gm. Discussed getting back to meeting 80gm of protein per day.  Suggested some higher protein meals such as tuna she can have a lunch. Since she logs through the day suggested to use a protein shake, if needed, if she notices her day isn't going to add up to the 80g.  Pt did start taking creatine last week and finds it helps with her recovery after her workouts. Pt will f/u in 2 weeks.     Patient seen by Medical Provider in past 6 months:  no  Requested to schedule appointment with Medical Provider: No    Anthropometric Measurements  Program Start Weight (#): 179.6# (2/26/25)  Current Weight (#): 169.6#  TBW % Change from start weight: 6% (10#)  IBW: 120# (64\")  Goal Weight (#): 150     Weight Loss History  Previous weight loss attempts: Commercial Programs (Weight Watchers, Divya [employer], etc.)  Exercise  Self " Created Diets (Portion Control, Healthy Food Choices, etc.)    Food and Nutrition Related History  Sleep quality: 8-9 hrs of sleep  Mild RAFAEL on sleep study, but likely not need CPAP per pulm    Dietary Recall  Logging in the lose it marycruz-about 2399-6794 calories.   Breakfast: 8am-this morning is 2 eggs + slice of toast (going to change to the olu's killer bread). Continues to measure her sugar (1tbsp) and creamer (less than what she was using) in her coffee *tried monk fruit but didn't like so 1 tbsp of raw sugar + 2 tbsp half + half (40 cals)--still doesn't taste the same, but still trying to find the right balance.  OR tamra pudding with or w/o greek yogurt for added protein  10:45am at the gym-- Monday and Wednesday with  and now added in more cardio and chair yoga   Snack: trying ideas from the handout provided.  Lunch: 1pm-today was yogurt and peaches w/granola.  OR did bring home canned smoked trout OR cottage cheese (good culture) with fruit OR greek yogurt with fruit and ~ 1/4 cup granola OR tuna mixed with avocado edwards + celery on 1/2 light english muffin or slice of sourdough OR sardines out of can with crackers OR Remedy shake (170 cals, 12gm protein) when on the go.  Snack: raw nuts will pick on OR dried fruit OR bread sticks  Dinner: 6p-stir cardenas with tofu OR salmon with veggies OR pasta OR stuffed pepper with beans and rice instead of meat OR bean and cheese burritos OR cod with tomatoes + lemon juice. Was a little more eating out when was visiting in [place].  Snack: pudding made with skim milk (1/2 cup) OR Yasso bar or some chocolate    Beverages: water with electrolyte, when work out puts in BCAA, coffee with salt +now measuring cream and sugar so using less + 2 scoops of collagen peptides  Volume of beverage intake: 6 cups of water per day,     Weekends: same  Cravings: occ for salt or dark chocolate  Trouble area of day: -    Frequency of Eating out: 2x/month   Food restrictions: avoids meat and  poultry (pescatarian), avoids artifical sweeteners and stevia  Cooking:   Food Shopping:     Occupation: retired     Vitamins:  Pre and probiotics    Physical Activity  Activity: works with  2x/week (Mon + Wed), now 1x/week doing spin (Tues), Thurs chair yoga, Friday started with horseback riding (weather dependant) + 2-2.5 miles walking/jogging 2x/week (Sat + Sun) (overall doing an 18 min mile)--when was away was working out with a  about 2x/week and more walking   Frequency: 2 days for sure--in the past week went to spin once a week and started walking.  Physical limitations/barriers to exercise: -    Estimated Needs  [employer] Needs (needs at 179#)  BMR: 1312 kcal  Maintenance calories (sedentary): 1574 kcal  1-2#/week loss (sedentary): 574-1074 kcal  1/2# per week sedentary:  1324 cals  1-2#/week loss (light activity): 804-1304 kcal  1/2# wt loss light activity:  1554 cals    Protein: 65-82 grams (1.2-1.5g/kg IBW)  Total Fluid: 2324 ml ([place]-Segar Method) [#]  Free Fluid: 1859 ml ([place]-Segar Method - 20%)    Nutrition Diagnosis  Yes;    Overweight/obesity  related to Excess energy intake as evidenced by  BMI more than normative standard for age and sex (overweight BMI 25-29.9)       Nutrition Intervention    Nutrition Prescription  Calories: 1300 kcal for 1/2# (sedentary) to 1# wt loss (light activity--adding in cardio workouts)/wk   Advised if went up to 1500 cals then would lose 1/2# per wk if adding in the extra cardio workouts.  Protein: 80 g  Fluid: 1860 ml    Meal Plan (Tom/Pro)  Breakfast: 300-450 (can go to 450 if doesn't have mid-morning snack)  Snack: optional 150cal  Lunch: 300  Snack: 150-200  Dinner: 400  Snack: 100-150    Nutrition Education  Calorie controlled menu  Lean protein food choices  Healthy snack options  Appropriate portions  Appropriate meal spacing    Nutrition Counseling  Strategies: motivational interviewing, goal setting,  personalized meal planning    Monitoring and Evaluation:    Evaluation criteria  Energy and protein intake  Fluid intake  Monitor weekly weight  Meal planning/preparation  Calorie tracking/Measuring  Portion control   Physical activity     Barriers to change:none  Readiness to change: Action:  (Changing behavior)  Comprehension: good  Expected Compliance: good

## 2025-05-08 ENCOUNTER — CLINICAL SUPPORT (OUTPATIENT)
Dept: BARIATRICS | Facility: CLINIC | Age: 70
End: 2025-05-08

## 2025-05-08 VITALS — BODY MASS INDEX: 28.48 KG/M2 | HEIGHT: 64 IN | WEIGHT: 166.8 LBS

## 2025-05-08 DIAGNOSIS — R63.5 ABNORMAL WEIGHT GAIN: Primary | ICD-10-CM

## 2025-05-08 PROCEDURE — RECHECK

## 2025-05-08 NOTE — PROGRESS NOTES
"Weight Management Medical Nutrition Assessment  Sun is here today for 3 of 3 RD bundle visit.  Current weight 166.8 # which is a 2.8# wt loss in the past 2 weeks. Pt has continues to keep a food log and now aiming fro 115gm protein.  Discussed with pt that last visit when we discussed better meeting protein needs, suggested the higher end of her needs at 80gm.  If pt feels higher protein intake is working better for her but meeting 115gm is challenging target, suggested aiming in the middle at 100gm protein per day. With pt's limitation of protein intake, pt reports she is getting tired of what she is eating. She has been using her protein powder more often, having a shake and/or putting some in her coffee each day.  Reviewed pt's food log, looking specifically at the percentages of macros.  Advised pt that she is doing well with getting 40% carbs, 25-30% protein and 30-35% fat.  Will come up some alternative meals ideas to best meet protein needs and provide variety. Pt will f/u in 2 weeks and purchase another bundle.    Patient seen by Medical Provider in past 6 months:  no  Requested to schedule appointment with Medical Provider: No    Anthropometric Measurements  Program Start Weight (#): 179.6# (2/26/25)  Current Weight (#): 166.8#  TBW % Change from start weight: 7% (12.8#)  IBW: 120# (64\")  Goal Weight (#): 150     Weight Loss History  Previous weight loss attempts: Commercial Programs (Weight Watchers, [employer], etc.)  Exercise  Self Created Diets (Portion Control, Healthy Food Choices, etc.)    Food and Nutrition Related History  Sleep quality: 8-9 hrs of sleep  Mild RAFAEL on sleep study, but likely not need CPAP per pulm    Dietary Recall  Logging in the lose it marycruz-about 8916-5908 calories, good break down of 30% pro and fat, 40% carbs    B: Protein powder in coffee, overnight oats with tamra seeds, pr powder, banana + berries OR avocado toastl + 1 egg on toat with pr powder in coffee + salad and tomato "   L: Cottage cheese and chi OR pro shake w/berries + silken tofu + skim milk + greek yogurt + pro powder +maple syrup  D: 2 chicken w/skin + roast veggies  OR ground beef taco + cottage cheese, green beans, avocado sweet pot.   Snacks:  beef stick, greek yogurt + tamra pudding + nuts   Wants more variety--pt limited with, will come with other options.   Getting some leg cramps--going to try to change up her electrolytes    Beverages: water with electrolyte, when work out puts in BCAA, coffee with salt +now measuring cream and sugar so using less + 2 scoops of collagen peptides  Volume of beverage intake: 6 cups of water per day,     Weekends: same  Cravings: occ for salt or dark chocolate  Trouble area of day: -    Frequency of Eating out: 2x/month   Food restrictions: avoids meat and poultry (pescatarian), avoids artifical sweeteners and stevia  Cooking:   Food Shopping:     Occupation: retired     Vitamins:  Pre and probiotics    Physical Activity  Activity: works with  2x/week (Mon + Wed), now 1x/week doing spin (Tues), Thurs chair yoga, Friday started with horseback riding (weather dependant) + 2-2.5 miles walking/jogging 2x/week (Sat + Sun) (overall doing an 18 min mile)  Frequency: 2-5 days per week  Physical limitations/barriers to exercise: -    Estimated Needs  [facility] Energy Needs (needs at 179#)  BMR: 1312 kcal  Maintenance calories (sedentary): 1574 kcal  1-2#/week loss (sedentary): 574-1074 kcal  1/2# per week sedentary:  1324 cals  1-2#/week loss (light activity): 804-1304 kcal  1/2# wt loss light activity:  1554 cals    Protein: 65-82 grams (1.2-1.5g/kg IBW)  Total Fluid: 2324 ml ([place]-Segar Method) [#]  Free Fluid: 1859 ml ([place]-Segar Method - 20%)    Nutrition Diagnosis  Yes;    Overweight/obesity  related to Excess energy intake as evidenced by  BMI more than normative standard for age and sex (overweight BMI 25-29.9)       Nutrition  Intervention    Nutrition Prescription  Calories: 1300 kcal for 1/2# (sedentary) to 1# wt loss (light activity--adding in cardio workouts)/wk   Advised if went up to 1500 cals then would lose 1/2# per wk if adding in the extra cardio workouts.  Protein: 80 g  Fluid: 1860 ml    Meal Plan (Tom/Pro)  Breakfast: 300-450 (can go to 450 if doesn't have mid-morning snack)  Snack: optional 150cal  Lunch: 300  Snack: 150-200  Dinner: 400  Snack: 100-150    Nutrition Education  Calorie controlled menu  Lean protein food choices  Healthy snack options  Appropriate portions  Appropriate meal spacing    Nutrition Counseling  Strategies: motivational interviewing, goal setting, personalized meal planning    Monitoring and Evaluation:    Evaluation criteria  Energy and protein intake  Fluid intake  Monitor weekly weight  Meal planning/preparation  Calorie tracking/Measuring  Portion control   Physical activity     Barriers to change:none  Readiness to change: Action:  (Changing behavior)  Comprehension: good  Expected Compliance: good

## 2025-05-22 ENCOUNTER — CLINICAL SUPPORT (OUTPATIENT)
Dept: BARIATRICS | Facility: CLINIC | Age: 70
End: 2025-05-22
Payer: COMMERCIAL

## 2025-05-22 VITALS — WEIGHT: 167.2 LBS | HEIGHT: 64 IN | BODY MASS INDEX: 28.54 KG/M2

## 2025-05-22 DIAGNOSIS — R63.5 ABNORMAL WEIGHT GAIN: Primary | ICD-10-CM

## 2025-05-22 PROCEDURE — DB3PK

## 2025-05-22 PROCEDURE — RECHECK

## 2025-05-22 NOTE — PROGRESS NOTES
"Weight Management Medical Nutrition Assessment  Sun is here today for 1 of 3 RD bundle visit.  Current weight 167.2 # which is a +0.4# wt gain in the past 2 weeks, but overall a weight loss of 12.3#.  Pt has continues to keep a food log and now aiming fro 115gm protein.  She has incorporated some more animal protein to help provide variety and hit her protein goals. She has been noticing some stress snacking and has had a few events where she went over on her calories therefore, on average she likely went over just enough to her maintenance zone vs her wt loss zone. Discussed trying some herbal tea instead of snacking when stressed.  She does c/o some lower abd pain and occ foot cramping.  She reports normal Bms and that she is drinking about 48-55oz of fluids per day. She already uses electrolytes in her fluids.  Suggested to increase her fluids to 70oz per day and assess. Pt will also likely make a f/u appt with her PCP if the abd pain continues.      Patient seen by Medical Provider in past 6 months:  no  Requested to schedule appointment with Medical Provider: No    Anthropometric Measurements  Program Start Weight (#): 179.6# (2/26/25)  Current Weight (#): 167.2#  TBW % Change from start weight: 7% (12.3#)  IBW: 120# (64\")  Goal Weight (#): 150     Weight Loss History  Previous weight loss attempts: Commercial Programs (Weight Watchers, [email or web address], etc.)  Exercise  Self Created Diets (Portion Control, Healthy Food Choices, etc.)    Food and Nutrition Related History  Sleep quality: 8-9 hrs of sleep  Mild RAFAEL on sleep study, but likely not need CPAP per pulm    Dietary Recall  Logging in the lose it marycruz  Calories were a little over the recommended wt loss zone calories so maintained--is incorporating more chicken and beef to better meet higher protein needs.     B: Protein powder in coffee, greek yogurt + berries + granola  L: Cottage cheese and chi OR pro shake w/berries + silken tofu + skim milk + greek " yogurt + pro powder +maple syrup  D: 2 chicken w/skin + roast veggies  OR ground beef taco + cottage cheese, green beans, avocado sweet pot OR 2 slices of pizza + little chocolate cake for a [employer] event OR out to dinner and had 8oz forrest burger + shrimp.  Snacks:  beef stick, greek yogurt + tamra pudding + nuts   Getting some leg cramps--going to try to change up her electrolytes and increase her fluids to at least 70oz    Beverages: water with electrolyte, when work out puts in BCAA, coffee with salt +now measuring cream and sugar so using less + 2 scoops of collagen peptides  Volume of beverage intake: 6 cups of water per day--increase to 70oz    Weekends: same  Cravings: occ for salt or dark chocolate  Trouble area of day: -    Frequency of Eating out: 2x/month   Food restrictions: avoids meat and poultry (pescatarian), avoids artifical sweeteners and stevia  Cooking:   Food Shopping:     Occupation: retired     Vitamins:  Pre and probiotics    Physical Activity  Activity: works with  2x/week (Mon + Wed), now 1x/week doing spin (Tues), Thurs chair yoga, Friday started with horseback riding (weather dependant) + 2-2.5 miles walking/jogging 2x/week (Sat + Sun) (overall doing an 18 min mile)  Frequency: 2-5 days per week  Physical limitations/barriers to exercise: -    Estimated Needs  [facility] Energy Needs (needs at 179#)  BMR: 1312 kcal  Maintenance calories (sedentary): 1574 kcal  1-2#/week loss (sedentary): 574-1074 kcal  1/2# per week sedentary:  1324 cals  1-2#/week loss (light activity): 804-1304 kcal  1/2# wt loss light activity:  1554 cals    Protein: 65-82 grams (1.2-1.5g/kg IBW)  Total Fluid: 2324 ml ([place]-Segar Method) [#]  Free Fluid: 1859 ml ([place]-Segar Method - 20%)    Nutrition Diagnosis  Yes;    Overweight/obesity  related to Excess energy intake as evidenced by  BMI more than normative standard for age and sex (overweight BMI 25-29.9)       Nutrition  Intervention    Nutrition Prescription  Calories: 1300 kcal for 1/2# (sedentary) to 1# wt loss (light activity--adding in cardio workouts)/wk   Advised if went up to 1500 cals then would lose 1/2# per wk if adding in the extra cardio workouts.  Protein: 80 g  Fluid: 1860 ml    Meal Plan (Tom/Pro)  Breakfast: 300-450 (can go to 450 if doesn't have mid-morning snack)  Snack: optional 150cal  Lunch: 300  Snack: 150-200  Dinner: 400  Snack: 100-150    Nutrition Education  Calorie controlled menu  Lean protein food choices  Healthy snack options  Appropriate portions  Appropriate meal spacing    Nutrition Counseling  Strategies: motivational interviewing, goal setting, personalized meal planning    Monitoring and Evaluation:    Evaluation criteria  Energy and protein intake  Fluid intake  Monitor weekly weight  Meal planning/preparation  Calorie tracking/Measuring  Portion control   Physical activity     Barriers to change:none  Readiness to change: Action:  (Changing behavior)  Comprehension: good  Expected Compliance: good

## 2025-06-10 ENCOUNTER — CLINICAL SUPPORT (OUTPATIENT)
Dept: BARIATRICS | Facility: CLINIC | Age: 70
End: 2025-06-10

## 2025-06-10 VITALS — WEIGHT: 166.6 LBS | HEIGHT: 64 IN | BODY MASS INDEX: 28.44 KG/M2

## 2025-06-10 DIAGNOSIS — R63.5 ABNORMAL WEIGHT GAIN: Primary | ICD-10-CM

## 2025-06-10 PROCEDURE — RECHECK

## 2025-06-10 NOTE — PROGRESS NOTES
"Weight Management Medical Nutrition Assessment  Sun is here today for 2 of 3 RD bundle visit.  Current weight 166.6# which is a -0.6# wt gain in the past 2 weeks, but overall a weight loss of 13#. She does admit she is having difficulty avoiding snacking while working at times.  Reviewed pt's food log and she does appear to be logging/averaging closer to her maintenance calories than her weight loss calories.   Encouraged between 9314-4985 for 1/2 to 1 # weight loss per week. Encouraged, as much as possible, to plan out her day ie: if going out to eat for a meal choose a lower calorie breakfast for that day.  Her breakfast often can be closer to 400-500 calories at times. Pt does c/o of come intermittent cramping in her lower abdomen. She did go to the PCP and had some blood work and going for US. Pt reports she isn't constipated but when asked how often she has a BM she reports every other day to every 2 days.  Suggested Miralax, but pt wanted something more natural so will be incorporating more foods ie: kiwi, prunes, plums, pears, peaches, berries, etc.She has been much better with her fluid intake.  Pt will f/u in about 2 weeks for 3rd visit and will repeat body comp in 1 month.     Patient seen by Medical Provider in past 6 months:  no  Requested to schedule appointment with Medical Provider: No    Anthropometric Measurements  Program Start Weight (#): 179.6# (2/26/25)  Current Weight (#): 166.6#  TBW % Change from start weight: 7% (13#)  IBW: 120# (64\")  Goal Weight (#): 150     Weight Loss History  Previous weight loss attempts: Commercial Programs (Weight Watchers, [employer], etc.)  Exercise  Self Created Diets (Portion Control, Healthy Food Choices, etc.)    Food and Nutrition Related History  Sleep quality: 8-9 hrs of sleep  Mild RAFAEL on sleep study, but likely not need CPAP per pulm    Dietary Recall  Logging in the lose it marycruz  Calories were a little over the recommended wt loss zone calories so " maintained--is incorporating more chicken and beef to better meet higher protein needs.     B: Protein powder in coffee, greek yogurt + berries + granola or overnight oats with greek yogurt + tamra seeds + 1/2 scoop protein powder OR 2 eggs + egg whites + cheese made in butter   L: Cottage cheese and chi OR pro shake w/berries + silken tofu + skim milk + greek yogurt + pro powder +maple syrup OR out to eat with friends  D: 2 chicken w/skin + roast veggies  OR ground beef taco + cottage cheese, green beans, avocado sweet pot OR out to dinnner  Snacks during the day:  beef stick + bread and butter, greek yogurt + tamra pudding + nuts  OR grazing while working--triscuits (4) or pumpkin seeds or dried chick peas    Beverages: water with electrolyte, when work out puts in BCAA, coffee with salt +now measuring cream and sugar so using less + 2 scoops of collagen peptides  Volume of beverage intake: 6 cups of water per day--increased to 70oz most days    Weekends: same  Cravings: occ for salt or dark chocolate  Trouble area of day: -    Frequency of Eating out: 2x/month   Food restrictions: added in more animal protien  Cooking:   Food Shopping:     Occupation: retired     Vitamins:  Pre and probiotics    Physical Activity  Activity: works with  2x/week (Mon + Wed),  riding horses more 2x/week, 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    Frequency: 2-5 days per week  Physical limitations/barriers to exercise: -    Estimated Needs  [facility] Energy Needs (needs at 179#)  BMR: 1312 kcal  Maintenance calories (sedentary): 1574 kcal  1-2#/week loss (sedentary): 574-1074 kcal  1/2# per week sedentary:  1324 cals  1-2#/week loss (light activity): 804-1304 kcal  1/2# wt loss light activity:  1554 cals    Protein: 65-82 grams (1.2-1.5g/kg IBW)  Total Fluid: 2324 ml ([place]-Segar Method) [#]  Free Fluid: 1859 ml ([place]-Segar Method - 20%)    Nutrition Diagnosis  Yes;    Overweight/obesity  related to Excess energy  intake as evidenced by  BMI more than normative standard for age and sex (overweight BMI 25-29.9)       Nutrition Intervention    Nutrition Prescription  Calories: 1225 kcal for 1/2# (sedentary) to 1# wt loss (light activity--adding in cardio workouts)/wk   Advised if went up to 1475 cals then would lose 1/2# per wk if adding in the extra cardio workouts.  Protein: 80g, but pt is pushing closer to 100gm  Fluid: 1860 ml    Meal Plan (Tom/Pro)  Breakfast: 300-450 (can go to 450 if doesn't have mid-morning snack)  Snack: optional 150cal  Lunch: 300  Snack: 150-200  Dinner: 400  Snack: 100-150    Nutrition Education  Calorie controlled menu  Lean protein food choices  Healthy snack options  Appropriate portions  Appropriate meal spacing    Nutrition Counseling  Strategies: motivational interviewing, goal setting, personalized meal planning    Monitoring and Evaluation:    Evaluation criteria  Energy and protein intake  Fluid intake  Monitor weekly weight  Meal planning/preparation  Calorie tracking/Measuring  Portion control   Physical activity     Barriers to change:none  Readiness to change: Action:  (Changing behavior)  Comprehension: good  Expected Compliance: good

## 2025-06-27 ENCOUNTER — HOSPITAL ENCOUNTER (OUTPATIENT)
Dept: ULTRASOUND IMAGING | Facility: HOSPITAL | Age: 70
Discharge: HOME | End: 2025-06-27
Attending: FAMILY MEDICINE
Payer: COMMERCIAL

## 2025-06-27 DIAGNOSIS — R10.30 LOWER ABDOMINAL PAIN: ICD-10-CM

## 2025-06-27 PROCEDURE — 76700 US EXAM ABDOM COMPLETE: CPT

## 2025-07-02 ENCOUNTER — CLINICAL SUPPORT (OUTPATIENT)
Dept: BARIATRICS | Facility: CLINIC | Age: 70
End: 2025-07-02

## 2025-07-02 DIAGNOSIS — R63.5 ABNORMAL WEIGHT GAIN: Primary | ICD-10-CM

## 2025-07-02 PROCEDURE — RECHECK

## 2025-07-02 NOTE — PROGRESS NOTES
"Weight Management Medical Nutrition Assessment  Sun is here today for 3 of 3 RD bundle visit.  Current weight 165.2# which is a -1.4# wt loss in the past 3 weeks and overall weight loss of 14.3# (8% TBW).  Pt continues to have lower abdominal cramping that is bearable most times, but at times is a stabbing pain. She did stop taking the creatine about 1.5 weeks ago thinking that could be the issue, but she continues to have the pain.  With stopping the creatine she is feeling it is more difficult to recover from her workouts.  Pt did have an US of the abdomen on Friday, awaiting the results. Pt reports to have a GI appt in 3 weeks, but she is going to call to see if she can get that moved up. Pt reports her BMs aren't her usual. She is having small, multiple BMs that are softer. Question, despite BMs, pt experiencing some constipation.  Again, pt will f/u with GI.    As far as diet recall, pt reports to be logging less because a lot of events going on. She did still lose ~1.5lbs in the past 3 week which is about 1/2# per week. She always has a bigger breakfast before her lifting days and today she worked with her  and lifted so her breakfast was about 400-500cals.  She plans on going home to have lunch after appt.  She still struggles with snacking at night often around 10pm; isn't going to bed until 11pm-12am. Suggested to have 1 snack, portioned out or drink water or go to be earlier.  Also, advised pt that she has access to meet with our SW to discuss behaviors.  Pt will f/u in 2 weeks for final body comp in bundle.       Patient seen by Medical Provider in past 6 months:  no  Requested to schedule appointment with Medical Provider: No    Anthropometric Measurements  Program Start Weight (#): 179.6# (2/26/25)  Current Weight (#): 165.2#  TBW % Change from start weight: 8% (14.3#)  IBW: 120# (64\")  Goal Weight (#): 150     Weight Loss History  Previous weight loss attempts: Commercial Programs (Weight " Watchers, Divya Garcia, etc.)  Exercise  Self Created Diets (Portion Control, Healthy Food Choices, etc.)    Food and Nutrition Related History  Sleep quality: 8-9 hrs of sleep  Mild RAFAEL on sleep study, but likely not need CPAP per pulm    Dietary Recall  Logging in the lose it marycruz less often  Pt more concerned about her abd pain/cramping     B: Protein powder in coffee, today 3 eggs + 2.3 oz ham + veggies (400-500cals) when goes lifting 2x/week.   L: Cottage cheese and fruit OR pro shake w/berries + silken tofu + skim milk + greek yogurt + pro powder +maple syrup OR out to eat with friends  D: 2 chicken w/skin + roast veggies  OR ground beef taco + cottage cheese, green beans, avocado sweet pot OR out to dinnner  Snacks during the day:  beef stick + bread and butter, greek yogurt + tamra pudding + nuts  OR grazing while working--triscuits (4) or pumpkin seeds or dried chick peas  Night snack at 10pm-salty carb snack. Suggested ok as long as it is portioned and fits in her calorie needs for the day.  Encouraged to drink fluids at this time also and offered meeting with NIKI.  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    Frequency: 2-5 days per week  Physical limitations/barriers to exercise: -    Estimated Needs  [employer] [facility] Energy Needs (needs at 179#)  BMR: 1318  kcal  Maintenance calories (sedentary): 1574 kcal  1-2#/week loss (sedentary): 574-1074 kcal  1/2# per week sedentary:  1324 cals  1-2#/week loss (light activity): 804-1304 kcal  1/2# wt loss light activity:  1554 cals    Protein: 65-82 grams (1.2-1.5g/kg IBW)  Total Fluid: 2324 ml ([place]-Segar Method) [#]  Free Fluid: 1859 ml ([place]-Segar Method - 20%)    Nutrition Diagnosis  Yes;    Overweight/obesity  related to Excess energy intake as evidenced by  BMI more than normative standard for age and sex (overweight BMI 25-29.9)       Nutrition Intervention    Nutrition Prescription  Calories: 1225 kcal for 1/2# (sedentary) to 1# wt loss (light activity--adding in cardio workouts)/wk   Advised if went up to 1475 cals then would lose 1/2# per wk if adding in the extra cardio workouts.  Protein: 80g, but pt is pushing closer to 100gm  Fluid: 1860 ml    Meal Plan (Tom/Pro)  Breakfast: 300-450 (can go to 450 if doesn't have mid-morning snack)  Snack: optional 150cal  Lunch: 300  Snack: 150-200  Dinner: 400  Snack: 100-150    Nutrition Education  Calorie controlled menu  Lean protein food choices  Healthy snack options  Appropriate portions  Appropriate meal spacing    Nutrition Counseling  Strategies: motivational interviewing, goal setting, personalized meal planning    Monitoring and Evaluation:    Evaluation criteria  Energy and protein intake  Fluid intake  Monitor weekly weight  Meal planning/preparation  Calorie tracking/Measuring  Portion control   Physical activity     Barriers to change:none  Readiness to change: Action:  (Changing behavior)  Comprehension: good  Expected Compliance: good

## 2025-07-17 ENCOUNTER — CLINICAL SUPPORT (OUTPATIENT)
Dept: BARIATRICS | Facility: CLINIC | Age: 70
End: 2025-07-17

## 2025-07-17 VITALS — BODY MASS INDEX: 27.78 KG/M2 | WEIGHT: 162.7 LBS | HEIGHT: 64 IN

## 2025-07-17 DIAGNOSIS — R63.5 ABNORMAL WEIGHT GAIN: Primary | ICD-10-CM

## 2025-07-17 PROCEDURE — RECHECK

## 2025-07-17 NOTE — PROGRESS NOTES
"Body composition completed utilizing SECA scale and results reviewed with patient.  Pt last completed body comp in April and noted following changes:    -8.2# total weight from 4/3/25 body comp, -17# from start weight on 179.6#  -9.81# fat mass  +0.4# muscle mass  -3\" waist circumference    Pt will f/u in 3 weeks.   "

## 2025-07-21 ENCOUNTER — OFFICE VISIT (OUTPATIENT)
Dept: OBGYN CLINIC | Facility: CLINIC | Age: 70
End: 2025-07-21
Payer: COMMERCIAL

## 2025-07-21 ENCOUNTER — APPOINTMENT (OUTPATIENT)
Dept: RADIOLOGY | Facility: CLINIC | Age: 70
End: 2025-07-21
Attending: ORTHOPAEDIC SURGERY
Payer: COMMERCIAL

## 2025-07-21 VITALS — WEIGHT: 162 LBS | HEIGHT: 64 IN | BODY MASS INDEX: 27.66 KG/M2

## 2025-07-21 DIAGNOSIS — M17.12 PRIMARY OSTEOARTHRITIS OF LEFT KNEE: Primary | ICD-10-CM

## 2025-07-21 DIAGNOSIS — Z01.89 ENCOUNTER FOR LOWER EXTREMITY COMPARISON IMAGING STUDY: ICD-10-CM

## 2025-07-21 DIAGNOSIS — M25.562 LEFT KNEE PAIN, UNSPECIFIED CHRONICITY: ICD-10-CM

## 2025-07-21 PROCEDURE — 73562 X-RAY EXAM OF KNEE 3: CPT

## 2025-07-21 PROCEDURE — 73560 X-RAY EXAM OF KNEE 1 OR 2: CPT

## 2025-07-21 PROCEDURE — 99203 OFFICE O/P NEW LOW 30 MIN: CPT | Performed by: ORTHOPAEDIC SURGERY

## 2025-07-21 RX ORDER — DILTIAZEM HYDROCHLORIDE 60 MG/1
TABLET, FILM COATED ORAL
COMMUNITY

## 2025-07-21 RX ORDER — GINSENG 100 MG
CAPSULE ORAL
COMMUNITY
Start: 2025-02-27

## 2025-07-21 RX ORDER — FUROSEMIDE 20 MG/1
20 TABLET ORAL DAILY
COMMUNITY
Start: 2024-12-10 | End: 2025-12-10

## 2025-07-21 RX ORDER — FLUTICASONE PROPIONATE 50 MCG
2 SPRAY, SUSPENSION (ML) NASAL DAILY
COMMUNITY

## 2025-07-21 RX ORDER — FAMOTIDINE 40 MG/1
TABLET, FILM COATED ORAL
COMMUNITY

## 2025-07-21 RX ORDER — ESOMEPRAZOLE MAGNESIUM 20 MG/1
TABLET, DELAYED RELEASE ORAL
COMMUNITY

## 2025-07-21 NOTE — PROGRESS NOTES
"Patient Name: Cristal Dumont      : 1955       MRN: [id number]   Encounter Provider: Alfredo Saenz DO   Encounter Date: 25  Encounter department: [facility]         Assessment & Plan  Primary osteoarthritis of left knee  Sun has left-sided knee pain which appears to be an acute exacerbation of mild osteoarthritis.  She has some patellofemoral arthritic changes on x-ray and some mild medial compartment arthritis but not very severe.  I do think she simply pushed herself too far with leg press and may have exacerbated the patellofemoral joint causing a small knee effusion today.  She does not have any signs of instability on examination to suggest a large meniscal injury.  I recommended conservative treatment of ice, anti-inflammatories and decreased lower extremity activity for the week.  I did offer her a cortisone injection or perhaps aspiration of her left knee but she declined this today.  If pain persists or worsens she would consider injection or even further imaging with an MRI if necessary.  She will follow-up with me as needed.  Orders:    XR knee 3 vw left non injury; Future    Encounter for lower extremity comparison imaging study    Orders:    XR knee 1 or 2 vw right; Future           Follow-up:  No follow-ups on file.     _____________________________________________________  CHIEF COMPLAINT:  Chief Complaint   Patient presents with    Left Knee - Pain       Height 5' 4\" (1.626 m), weight 73.5 kg (162 lb).  Pain Score:   5      SUBJECTIVE:  Cristal Dumont is a 69 y.o. female who presents to the office for evaluation for left-sided knee pain.  She states 1 day ago she was weightlifting in the gym and went for her personal record max with leg press and felt increased pain afterwards.  She did not feel a pop or any acute injury to the knee but later on the knee was bothering her more severely and she had some swelling.  She tried taking anti-inflammatories " yesterday.  She has no history of knee issue in the past.  She has pain straightening her knee or going downstairs but no mechanical symptoms locking or catching.  She has a history of acid reflux issues so she tries not to take a lot of NSAIDs.    PAST MEDICAL HISTORY:  Past Medical History[1]    PAST SURGICAL HISTORY:  Past Surgical History[2]    FAMILY HISTORY:  Family History[3]    SOCIAL HISTORY:  Social History[4]    MEDICATIONS:  Current Medications[5]    ALLERGIES:  Allergies[6]      _____________________________________________________  Review of Systems   Constitutional:  Negative for chills, fever and unexpected weight change.   HENT:  Negative for hearing loss, nosebleeds and sore throat.    Eyes:  Negative for pain, redness and visual disturbance.   Respiratory:  Negative for cough, shortness of breath and wheezing.    Cardiovascular:  Negative for chest pain, palpitations and leg swelling.   Gastrointestinal:  Negative for abdominal pain, nausea and vomiting.   Endocrine: Negative for polydipsia and polyuria.   Genitourinary:  Negative for dysuria and hematuria.   Musculoskeletal:         See HPI   Skin:  Negative for rash and wound.   Neurological:  Negative for dizziness, numbness and headaches.   Psychiatric/Behavioral:  Negative for decreased concentration and suicidal ideas. The patient is not nervous/anxious.         Left Knee Exam     Tenderness   The patient is experiencing tenderness in the medial joint line.    Range of Motion   Extension:  normal   Flexion:  normal     Tests   Fish:  Medial - negative Lateral - negative  Lachman:  Anterior - negative      Drawer:       Posterior - negative    Other   Erythema: absent  Sensation: normal  Pulse: present  Swelling: mild  Effusion: effusion present             Physical Exam  Vitals and nursing note reviewed.   Constitutional:       Appearance: Normal appearance. She is well-developed.   HENT:      Head: Normocephalic and atraumatic.       Right Ear: External ear normal.      Left Ear: External ear normal.     Eyes:      General: No scleral icterus.     Extraocular Movements: Extraocular movements intact.      Conjunctiva/sclera: Conjunctivae normal.       Cardiovascular:      Rate and Rhythm: Normal rate.   Pulmonary:      Effort: Pulmonary effort is normal. No respiratory distress.     Musculoskeletal:      Cervical back: Normal range of motion and neck supple.      Left knee: Effusion present.      Instability Tests: Medial Fish test negative and lateral Fish test negative.      Comments: See Ortho exam     Skin:     General: Skin is warm and dry.     Neurological:      General: No focal deficit present.      Mental Status: She is alert and oriented to person, place, and time.     Psychiatric:         Behavior: Behavior normal.        _____________________________________________________  STUDIES REVIEWED:  I personally reviewed the pertinent images and my independent interpretation is as follows:  X-ray of the left knee demonstrates mild to moderate patellofemoral arthritic changes.  Mild medial compartment arthritic changes.  Small knee effusion.    PROCEDURES PERFORMED:  Procedures        This document was created using speech voice recognition software.   Grammatical errors, random word insertions, pronoun errors, and incomplete sentences are an occasional consequence of this system due to software limitations, ambient noise, and hardware issues.   Any formal questions or concerns about content, text, or information contained within the body of this dictation should be directly addressed to the provider for clarification.       [1]   Past Medical History:  Diagnosis Date    Acid reflux     Hypercholesteremia    [2]   Past Surgical History:  Procedure Laterality Date    BONE GRAFT Right     collarbone    TONSILLECTOMY      TUBAL LIGATION     [3] No family history on file.  [4]   Social History  Tobacco Use    Smoking status: Never     Smokeless tobacco: Never   Vaping Use    Vaping status: Never Used   Substance Use Topics    Alcohol use: Yes     Comment: once every 3 weeks    Drug use: Not Currently   [5]   Current Outpatient Medications:     aspirin (ECOTRIN LOW STRENGTH) 81 mg EC tablet, Take 81 mg by mouth in the morning., Disp: , Rfl:     Biotin 1000 MCG tablet, Take 5,000 mcg by mouth in the morning., Disp: , Rfl:     Calcium Carbonate (CALCIUM 600 PO), Take by mouth in the morning and in the evening., Disp: , Rfl:     Esomeprazole Magnesium 20 MG TBEC, , Disp: , Rfl:     famotidine (PEPCID) 40 MG tablet, , Disp: , Rfl:     fluticasone (FLONASE) 50 mcg/act nasal spray, 2 sprays into each nostril daily, Disp: , Rfl:     furosemide (LASIX) 20 mg tablet, Take 20 mg by mouth daily, Disp: , Rfl:     L-Lysine 1000 MG TABS, , Disp: , Rfl:     metroNIDAZOLE (METROCREAM) 0.75 % cream, Apply topically 2 (two) times a day To the nasal tip., Disp: 45 g, Rfl: 2    montelukast (SINGULAIR) 10 mg tablet, Take 10 mg by mouth daily at bedtime, Disp: , Rfl:     Multiple Vitamins-Minerals (multivitamin with minerals) tablet, Take 1 tablet by mouth in the morning., Disp: , Rfl:     rosuvastatin (CRESTOR) 10 MG tablet, Take 10 mg by mouth in the morning., Disp: , Rfl:     Symbicort 80-4.5 MCG/ACT inhaler, , Disp: , Rfl:     Zinc 50 MG TABS, , Disp: , Rfl:     albuterol (PROVENTIL HFA,VENTOLIN HFA) 90 mcg/act inhaler, Inhale 2 puffs every 6 (six) hours as needed for wheezing (Patient not taking: Reported on 7/21/2025), Disp: , Rfl:     calcium-vitamin D (OSCAL) 250-125 MG-UNIT per tablet, Take 1 tablet by mouth daily (Patient not taking: Reported on 1/17/2024), Disp: , Rfl:     esomeprazole (NexIUM) 40 MG capsule, Take 40 mg by mouth every morning before breakfast, Disp: , Rfl:     famotidine (PEPCID) 10 mg tablet, Take 10 mg by mouth in the morning, Disp: , Rfl:     lidocaine (Lidoderm) 5 %, Apply 1 patch topically over 12 hours daily Remove & Discard patch  within 12 hours or as directed by MD (Patient not taking: Reported on 1/17/2024), Disp: 30 patch, Rfl: 0    tiZANidine (ZANAFLEX) 2 mg tablet, Take 1 tablet (2 mg total) by mouth daily at bedtime (Patient not taking: Reported on 1/17/2024), Disp: 10 tablet, Rfl: 0  [6]   Allergies  Allergen Reactions    Biaxin [Clarithromycin] Rash

## 2025-07-21 NOTE — PROGRESS NOTES
"Patient Name: Cristal Dumont      : 1955       MRN: [id number]   Encounter Provider: Alfredo Saenz DO   Encounter Date: 25  Encounter department: [facility]         Assessment & Plan  Left knee pain, unspecified chronicity    Orders:    XR knee 3 vw left non injury; Future    Encounter for lower extremity comparison imaging study    Orders:    XR knee 1 or 2 vw right; Future           Follow-up:  No follow-ups on file.     _____________________________________________________  CHIEF COMPLAINT:  Chief Complaint   Patient presents with    Left Knee - Pain       Height 5' 4\" (1.626 m), weight 73.5 kg (162 lb).  Pain Score:   5      SUBJECTIVE:  Cristal Dumont is a 69 y.o. female who presents ***     PAST MEDICAL HISTORY:  Past Medical History[1]    PAST SURGICAL HISTORY:  Past Surgical History[2]    FAMILY HISTORY:  Family History[3]    SOCIAL HISTORY:  Social History[4]    MEDICATIONS:  Current Medications[5]    ALLERGIES:  Allergies[6]      _____________________________________________________  Review of Systems     Ortho Exam     Physical Exam  Vitals and nursing note reviewed.   Constitutional:       Appearance: Normal appearance. She is well-developed.   HENT:      Head: Normocephalic and atraumatic.      Right Ear: External ear normal.      Left Ear: External ear normal.     Eyes:      General: No scleral icterus.     Extraocular Movements: Extraocular movements intact.      Conjunctiva/sclera: Conjunctivae normal.       Cardiovascular:      Rate and Rhythm: Normal rate.   Pulmonary:      Effort: Pulmonary effort is normal. No respiratory distress.     Musculoskeletal:      Cervical back: Normal range of motion and neck supple.      Comments: See Ortho exam     Skin:     General: Skin is warm and dry.     Neurological:      General: No focal deficit present.      Mental Status: She is alert and oriented to person, place, and time.     Psychiatric:         Behavior: " Behavior normal.        _____________________________________________________  STUDIES REVIEWED:  I personally reviewed the pertinent images and my independent interpretation is as follows:      PROCEDURES PERFORMED:  Procedures        This document was created using speech voice recognition software.   Grammatical errors, random word insertions, pronoun errors, and incomplete sentences are an occasional consequence of this system due to software limitations, ambient noise, and hardware issues.   Any formal questions or concerns about content, text, or information contained within the body of this dictation should be directly addressed to the provider for clarification.       [1]   Past Medical History:  Diagnosis Date    Acid reflux     Hypercholesteremia    [2]   Past Surgical History:  Procedure Laterality Date    BONE GRAFT Right     collarbone    TONSILLECTOMY      TUBAL LIGATION     [3] No family history on file.  [4]   Social History  Tobacco Use    Smoking status: Never    Smokeless tobacco: Never   Vaping Use    Vaping status: Never Used   Substance Use Topics    Alcohol use: Yes     Comment: once every 3 weeks    Drug use: Not Currently   [5]   Current Outpatient Medications:     aspirin (ECOTRIN LOW STRENGTH) 81 mg EC tablet, Take 81 mg by mouth in the morning., Disp: , Rfl:     Biotin 1000 MCG tablet, Take 5,000 mcg by mouth in the morning., Disp: , Rfl:     Calcium Carbonate (CALCIUM 600 PO), Take by mouth in the morning and in the evening., Disp: , Rfl:     Esomeprazole Magnesium 20 MG TBEC, , Disp: , Rfl:     famotidine (PEPCID) 40 MG tablet, , Disp: , Rfl:     fluticasone (FLONASE) 50 mcg/act nasal spray, 2 sprays into each nostril daily, Disp: , Rfl:     furosemide (LASIX) 20 mg tablet, Take 20 mg by mouth daily, Disp: , Rfl:     L-Lysine 1000 MG TABS, , Disp: , Rfl:     metroNIDAZOLE (METROCREAM) 0.75 % cream, Apply topically 2 (two) times a day To the nasal tip., Disp: 45 g, Rfl: 2    montelukast  (SINGULAIR) 10 mg tablet, Take 10 mg by mouth daily at bedtime, Disp: , Rfl:     Multiple Vitamins-Minerals (multivitamin with minerals) tablet, Take 1 tablet by mouth in the morning., Disp: , Rfl:     rosuvastatin (CRESTOR) 10 MG tablet, Take 10 mg by mouth in the morning., Disp: , Rfl:     Symbicort 80-4.5 MCG/ACT inhaler, , Disp: , Rfl:     Zinc 50 MG TABS, , Disp: , Rfl:     albuterol (PROVENTIL HFA,VENTOLIN HFA) 90 mcg/act inhaler, Inhale 2 puffs every 6 (six) hours as needed for wheezing (Patient not taking: Reported on 7/21/2025), Disp: , Rfl:     calcium-vitamin D (OSCAL) 250-125 MG-UNIT per tablet, Take 1 tablet by mouth daily (Patient not taking: Reported on 1/17/2024), Disp: , Rfl:     esomeprazole (NexIUM) 40 MG capsule, Take 40 mg by mouth every morning before breakfast, Disp: , Rfl:     famotidine (PEPCID) 10 mg tablet, Take 10 mg by mouth in the morning, Disp: , Rfl:     lidocaine (Lidoderm) 5 %, Apply 1 patch topically over 12 hours daily Remove & Discard patch within 12 hours or as directed by MD (Patient not taking: Reported on 1/17/2024), Disp: 30 patch, Rfl: 0    tiZANidine (ZANAFLEX) 2 mg tablet, Take 1 tablet (2 mg total) by mouth daily at bedtime (Patient not taking: Reported on 1/17/2024), Disp: 10 tablet, Rfl: 0  [6]   Allergies  Allergen Reactions    Biaxin [Clarithromycin] Rash

## 2025-07-31 ENCOUNTER — OFFICE VISIT (OUTPATIENT)
Dept: OBGYN CLINIC | Facility: CLINIC | Age: 70
End: 2025-07-31
Payer: COMMERCIAL

## 2025-07-31 VITALS — WEIGHT: 162 LBS | BODY MASS INDEX: 27.66 KG/M2 | HEIGHT: 64 IN

## 2025-07-31 DIAGNOSIS — M23.92 INTERNAL DERANGEMENT OF LEFT KNEE: Primary | ICD-10-CM

## 2025-07-31 PROCEDURE — 99213 OFFICE O/P EST LOW 20 MIN: CPT | Performed by: ORTHOPAEDIC SURGERY

## 2025-07-31 RX ORDER — DICLOFENAC SODIUM 75 MG/1
75 TABLET, DELAYED RELEASE ORAL 2 TIMES DAILY
Qty: 60 TABLET | Refills: 0 | Status: SHIPPED | OUTPATIENT
Start: 2025-07-31

## 2025-08-05 ENCOUNTER — CLINICAL SUPPORT (OUTPATIENT)
Dept: BARIATRICS | Facility: CLINIC | Age: 70
End: 2025-08-05

## 2025-08-05 VITALS — HEIGHT: 64 IN | WEIGHT: 162.4 LBS | BODY MASS INDEX: 27.72 KG/M2

## 2025-08-05 DIAGNOSIS — R63.5 ABNORMAL WEIGHT GAIN: Primary | ICD-10-CM

## 2025-08-05 PROCEDURE — RECHECK

## 2025-08-05 PROCEDURE — DB3PK

## 2025-08-11 ENCOUNTER — HOSPITAL ENCOUNTER (OUTPATIENT)
Dept: RADIOLOGY | Facility: HOSPITAL | Age: 70
Discharge: HOME | End: 2025-08-11
Attending: ORTHOPAEDIC SURGERY
Payer: COMMERCIAL

## 2025-08-18 ENCOUNTER — OFFICE VISIT (OUTPATIENT)
Dept: OBGYN CLINIC | Facility: CLINIC | Age: 70
End: 2025-08-18
Payer: COMMERCIAL

## 2025-08-18 VITALS — BODY MASS INDEX: 27.66 KG/M2 | WEIGHT: 162 LBS | HEIGHT: 64 IN

## 2025-08-18 DIAGNOSIS — M17.12 PRIMARY OSTEOARTHRITIS OF LEFT KNEE: ICD-10-CM

## 2025-08-18 DIAGNOSIS — S83.242A OTHER TEAR OF MEDIAL MENISCUS, CURRENT INJURY, LEFT KNEE, INITIAL ENCOUNTER: Primary | ICD-10-CM

## 2025-08-18 PROCEDURE — 20610 DRAIN/INJ JOINT/BURSA W/O US: CPT | Performed by: ORTHOPAEDIC SURGERY

## 2025-08-18 PROCEDURE — 99213 OFFICE O/P EST LOW 20 MIN: CPT | Performed by: ORTHOPAEDIC SURGERY

## 2025-08-18 RX ORDER — LIDOCAINE HYDROCHLORIDE 10 MG/ML
4 INJECTION, SOLUTION INFILTRATION; PERINEURAL
Status: COMPLETED | OUTPATIENT
Start: 2025-08-18 | End: 2025-08-18

## 2025-08-18 RX ORDER — VIT C/B6/B5/MAGNESIUM/HERB 173 50-5-6-5MG
500 CAPSULE ORAL DAILY
COMMUNITY

## 2025-08-18 RX ORDER — DEXAMETHASONE SODIUM PHOSPHATE 10 MG/ML
40 INJECTION, SOLUTION INTRAMUSCULAR; INTRAVENOUS
Status: COMPLETED | OUTPATIENT
Start: 2025-08-18 | End: 2025-08-18

## 2025-08-18 RX ADMIN — LIDOCAINE HYDROCHLORIDE 4 ML: 10 INJECTION, SOLUTION INFILTRATION; PERINEURAL at 10:30

## 2025-08-18 RX ADMIN — DEXAMETHASONE SODIUM PHOSPHATE 40 MG: 10 INJECTION, SOLUTION INTRAMUSCULAR; INTRAVENOUS at 10:30

## 2025-08-20 ENCOUNTER — EVALUATION (OUTPATIENT)
Dept: PHYSICAL THERAPY | Facility: CLINIC | Age: 70
End: 2025-08-20
Attending: ORTHOPAEDIC SURGERY
Payer: COMMERCIAL

## 2025-08-20 DIAGNOSIS — S83.242A OTHER TEAR OF MEDIAL MENISCUS, CURRENT INJURY, LEFT KNEE, INITIAL ENCOUNTER: ICD-10-CM

## 2025-08-20 DIAGNOSIS — M17.12 PRIMARY OSTEOARTHRITIS OF LEFT KNEE: ICD-10-CM

## 2025-08-20 PROCEDURE — 97161 PT EVAL LOW COMPLEX 20 MIN: CPT
